# Patient Record
Sex: FEMALE | Race: BLACK OR AFRICAN AMERICAN | Employment: OTHER | URBAN - METROPOLITAN AREA
[De-identification: names, ages, dates, MRNs, and addresses within clinical notes are randomized per-mention and may not be internally consistent; named-entity substitution may affect disease eponyms.]

---

## 2017-06-02 ENCOUNTER — HOSPITAL ENCOUNTER (EMERGENCY)
Age: 57
Discharge: HOME | End: 2017-06-02
Attending: EMERGENCY MEDICINE | Admitting: EMERGENCY MEDICINE
Payer: MEDICARE

## 2017-06-02 VITALS
OXYGEN SATURATION: 100 % | DIASTOLIC BLOOD PRESSURE: 87 MMHG | HEIGHT: 64 IN | SYSTOLIC BLOOD PRESSURE: 134 MMHG | BODY MASS INDEX: 38.31 KG/M2 | RESPIRATION RATE: 18 BRPM | TEMPERATURE: 98 F | HEART RATE: 97 BPM | WEIGHT: 224.4 LBS

## 2017-06-02 DIAGNOSIS — R11.2 NON-INTRACTABLE VOMITING WITH NAUSEA, UNSPECIFIED VOMITING TYPE: Primary | ICD-10-CM

## 2017-06-02 DIAGNOSIS — R19.7 DIARRHEA, UNSPECIFIED TYPE: ICD-10-CM

## 2017-06-02 LAB
ALBUMIN SERPL BCP-MCNC: 4.1 G/DL (ref 3.4–5)
ALBUMIN/GLOB SERPL: 1.2 {RATIO} (ref 0.8–1.7)
ALP SERPL-CCNC: 102 U/L (ref 45–117)
ALT SERPL-CCNC: 13 U/L (ref 13–56)
ANION GAP BLD CALC-SCNC: 10 MMOL/L (ref 3–18)
APPEARANCE UR: CLEAR
AST SERPL W P-5'-P-CCNC: 14 U/L (ref 15–37)
BASOPHILS # BLD AUTO: 0 K/UL (ref 0–0.06)
BASOPHILS # BLD: 0 % (ref 0–2)
BILIRUB SERPL-MCNC: 0.5 MG/DL (ref 0.2–1)
BILIRUB UR QL: NEGATIVE
BUN SERPL-MCNC: 9 MG/DL (ref 7–18)
BUN/CREAT SERPL: 10 (ref 12–20)
CALCIUM SERPL-MCNC: 9.8 MG/DL (ref 8.5–10.1)
CHLORIDE SERPL-SCNC: 100 MMOL/L (ref 100–108)
CK MB CFR SERPL CALC: NORMAL % (ref 0–4)
CK MB SERPL-MCNC: <1 NG/ML (ref 5–25)
CK SERPL-CCNC: 46 U/L (ref 26–192)
CO2 SERPL-SCNC: 31 MMOL/L (ref 21–32)
COLOR UR: YELLOW
CREAT SERPL-MCNC: 0.93 MG/DL (ref 0.6–1.3)
DIFFERENTIAL METHOD BLD: NORMAL
EOSINOPHIL # BLD: 0.1 K/UL (ref 0–0.4)
EOSINOPHIL NFR BLD: 1 % (ref 0–5)
ERYTHROCYTE [DISTWIDTH] IN BLOOD BY AUTOMATED COUNT: 13.3 % (ref 11.6–14.5)
GLOBULIN SER CALC-MCNC: 3.5 G/DL (ref 2–4)
GLUCOSE SERPL-MCNC: 282 MG/DL (ref 74–99)
GLUCOSE UR STRIP.AUTO-MCNC: >1000 MG/DL
HCT VFR BLD AUTO: 42 % (ref 35–45)
HGB BLD-MCNC: 14.2 G/DL (ref 12–16)
HGB UR QL STRIP: NEGATIVE
KETONES UR QL STRIP.AUTO: NEGATIVE MG/DL
LEUKOCYTE ESTERASE UR QL STRIP.AUTO: NEGATIVE
LIPASE SERPL-CCNC: 292 U/L (ref 73–393)
LYMPHOCYTES # BLD AUTO: 36 % (ref 21–52)
LYMPHOCYTES # BLD: 1.7 K/UL (ref 0.9–3.6)
MCH RBC QN AUTO: 29.3 PG (ref 24–34)
MCHC RBC AUTO-ENTMCNC: 33.8 G/DL (ref 31–37)
MCV RBC AUTO: 86.6 FL (ref 74–97)
MONOCYTES # BLD: 0.4 K/UL (ref 0.05–1.2)
MONOCYTES NFR BLD AUTO: 8 % (ref 3–10)
NEUTS SEG # BLD: 2.6 K/UL (ref 1.8–8)
NEUTS SEG NFR BLD AUTO: 55 % (ref 40–73)
NITRITE UR QL STRIP.AUTO: NEGATIVE
PH UR STRIP: 5 [PH] (ref 5–8)
PLATELET # BLD AUTO: 194 K/UL (ref 135–420)
PMV BLD AUTO: 10.9 FL (ref 9.2–11.8)
POTASSIUM SERPL-SCNC: 3.3 MMOL/L (ref 3.5–5.5)
PROT SERPL-MCNC: 7.6 G/DL (ref 6.4–8.2)
PROT UR STRIP-MCNC: NEGATIVE MG/DL
RBC # BLD AUTO: 4.85 M/UL (ref 4.2–5.3)
SODIUM SERPL-SCNC: 141 MMOL/L (ref 136–145)
SP GR UR REFRACTOMETRY: 1.02 (ref 1–1.03)
TROPONIN I SERPL-MCNC: <0.02 NG/ML (ref 0–0.06)
UROBILINOGEN UR QL STRIP.AUTO: 1 EU/DL (ref 0.2–1)
WBC # BLD AUTO: 4.7 K/UL (ref 4.6–13.2)

## 2017-06-02 PROCEDURE — 99284 EMERGENCY DEPT VISIT MOD MDM: CPT

## 2017-06-02 PROCEDURE — 83690 ASSAY OF LIPASE: CPT

## 2017-06-02 PROCEDURE — 96375 TX/PRO/DX INJ NEW DRUG ADDON: CPT

## 2017-06-02 PROCEDURE — 74011250636 HC RX REV CODE- 250/636: Performed by: PHYSICIAN ASSISTANT

## 2017-06-02 PROCEDURE — 82550 ASSAY OF CK (CPK): CPT

## 2017-06-02 PROCEDURE — 80053 COMPREHEN METABOLIC PANEL: CPT

## 2017-06-02 PROCEDURE — 93005 ELECTROCARDIOGRAM TRACING: CPT

## 2017-06-02 PROCEDURE — 85025 COMPLETE CBC W/AUTO DIFF WBC: CPT

## 2017-06-02 PROCEDURE — 96374 THER/PROPH/DIAG INJ IV PUSH: CPT

## 2017-06-02 PROCEDURE — 96361 HYDRATE IV INFUSION ADD-ON: CPT

## 2017-06-02 PROCEDURE — 81003 URINALYSIS AUTO W/O SCOPE: CPT

## 2017-06-02 RX ORDER — KETOROLAC TROMETHAMINE 30 MG/ML
30 INJECTION, SOLUTION INTRAMUSCULAR; INTRAVENOUS
Status: COMPLETED | OUTPATIENT
Start: 2017-06-02 | End: 2017-06-02

## 2017-06-02 RX ORDER — ONDANSETRON 2 MG/ML
4 INJECTION INTRAMUSCULAR; INTRAVENOUS
Status: COMPLETED | OUTPATIENT
Start: 2017-06-02 | End: 2017-06-02

## 2017-06-02 RX ORDER — ONDANSETRON 4 MG/1
TABLET, ORALLY DISINTEGRATING ORAL
Qty: 10 TAB | Refills: 0 | Status: SHIPPED | OUTPATIENT
Start: 2017-06-02

## 2017-06-02 RX ORDER — KETOROLAC TROMETHAMINE 10 MG/1
10 TABLET, FILM COATED ORAL
Qty: 20 TAB | Refills: 0 | Status: SHIPPED | OUTPATIENT
Start: 2017-06-02 | End: 2017-06-07

## 2017-06-02 RX ADMIN — KETOROLAC TROMETHAMINE 30 MG: 30 INJECTION, SOLUTION INTRAMUSCULAR at 15:44

## 2017-06-02 RX ADMIN — SODIUM CHLORIDE 1000 ML: 900 INJECTION, SOLUTION INTRAVENOUS at 15:44

## 2017-06-02 RX ADMIN — ONDANSETRON 4 MG: 2 INJECTION INTRAMUSCULAR; INTRAVENOUS at 15:44

## 2017-06-02 NOTE — ED PROVIDER NOTES
HPI Comments: 64yo female presenting to ED with nausea, vomiting, diarrhea, abdominal discomfort, headache x4 days. Pt states she is diabetic, BS have been elevated ~300. Decreased appetite but able to tolerate fluids. Multiple episodes of diarrhea daily, denies blood. Denies fever, headaches, dizziness, CP, SOB, neck pain, back pain, urinary symptoms or any other symptoms at this time. Past Medical History:  3/28/2011: Brachial neuritis or radiculitis NOS  No date: Carpal tunnel syndrome on right  3/28/2011: Cervicalgia  No date: Diabetes ([address]  No date: GERD (gastroesophageal reflux disease)  No date: Hypertension  3/28/2011: Neuralgia, neuritis, and radiculitis, unspecif*  No date: Osteoarthritis  3/28/2011: Pain in joint, shoulder region      Comment: torn rotator cuff  3/28/2011: Postlaminectomy syndrome, cervical region   Angeles Russo MD PCP      Patient is a 64 y.o. female presenting with abdominal pain, vomiting, diarrhea, hyperglycemia, and head problem. The history is provided by the patient. Abdominal Pain    Associated symptoms include diarrhea and vomiting. Pertinent negatives include no fever, no nausea, no headaches, no chest pain and no back pain. Vomiting    Associated symptoms include abdominal pain and diarrhea. Pertinent negatives include no chills, no fever, no headaches, no cough and no headaches. Diarrhea    Associated symptoms include diarrhea and vomiting. Pertinent negatives include no fever, no nausea, no headaches, no chest pain and no back pain. High Blood Sugar    Associated symptoms include diarrhea and vomiting. Pertinent negatives include no fever, no nausea, no headaches, no chest pain and no back pain. Head Pain    Associated symptoms include vomiting. Pertinent negatives include no fever, no shortness of breath, no dizziness and no nausea.         Past Medical History:   Diagnosis Date    Brachial neuritis or radiculitis NOS 3/28/2011    Carpal tunnel syndrome on right     Cervicalgia 3/28/2011    Diabetes ([facility] [address]     GERD (gastroesophageal reflux disease)     Hypertension     Neuralgia, neuritis, and radiculitis, unspecified 3/28/2011    Osteoarthritis     Pain in joint, shoulder region 3/28/2011    torn rotator cuff    Postlaminectomy syndrome, cervical region 3/28/2011       Past Surgical History:   Procedure Laterality Date    HX ADENOIDECTOMY      HX CERVICAL LAMINECTOMY  2008(?)    HX DILATION AND CURETTAGE      HX ORTHOPAEDIC      right carpal tunnel release, right shoulder arthroscopy & rotator cuff repair    HX ORTHOPAEDIC      HX ORTHOPAEDIC      right shoulder rotator cuff repair    HX SCOT AND BSO      HX TONSILLECTOMY      HX TUBAL LIGATION           Family History:   Problem Relation Age of Onset    Diabetes Other     Hypertension Other     Other Other      CVA    Cancer Other      ovarian, prostate       Social History     Social History    Marital status:      Spouse name: N/A    Number of children: N/A    Years of education: N/A     Occupational History    Not on file. Social History Main Topics    Smoking status: Former Smoker     Quit date: 3/28/1991    Smokeless tobacco: Not on file    Alcohol use 2.0 oz/week     4 Cans of beer per week      Comment: Occasionally    Drug use: No    Sexual activity: Not on file     Other Topics Concern    Not on file     Social History Narrative         ALLERGIES: Bactrim [sulfamethoprim ds]; Codeine; and Vicodin [hydrocodone-acetaminophen]    Review of Systems   Constitutional: Negative for chills and fever. HENT: Negative. Negative for congestion and facial swelling. Eyes: Negative for discharge and redness. Respiratory: Negative for cough and shortness of breath. Cardiovascular: Negative for chest pain. Gastrointestinal: Positive for abdominal pain, diarrhea and vomiting. Negative for nausea. Endocrine: Negative. Genitourinary: Negative. Musculoskeletal: Negative for back pain and neck pain. Skin: Negative for rash and wound. Allergic/Immunologic: Negative. Neurological: Negative for dizziness, light-headedness and headaches. Hematological: Negative. Psychiatric/Behavioral: Negative. Vitals:    06/02/17 1452   BP: 134/87   Pulse: 97   Resp: 18   Temp: 98 °F (36.7 °C)   SpO2: 100%   Weight: 101.8 kg (224 lb 6.4 oz)   Height: 5' 4\" (1.626 m)            Physical Exam   Constitutional: She is oriented to person, place, and time. She appears well-developed and well-nourished. No distress. HENT:   Head: Normocephalic and atraumatic. Mouth/Throat: Oropharynx is clear and moist.   Eyes: Conjunctivae are normal.   Neck: Normal range of motion. Neck supple. Cardiovascular: Normal rate, regular rhythm and normal heart sounds. Pulmonary/Chest: Effort normal and breath sounds normal. No respiratory distress. She has no wheezes. She exhibits no tenderness. Abdominal: Soft. Bowel sounds are normal. She exhibits no distension and no mass. There is no tenderness. There is no rebound and no guarding. Musculoskeletal: Normal range of motion. Neurological: She is alert and oriented to person, place, and time. No cranial nerve deficit. Coordination normal.   CN2-12 intact. no nystagmus, neg pronator drift, neg romberg, neg LE drift. Normal gait. Skin: Skin is warm and dry. No rash noted. She is not diaphoretic. Psychiatric: She has a normal mood and affect. Nursing note and vitals reviewed. MDM  Number of Diagnoses or Management Options  Diarrhea, unspecified type:   Non-intractable vomiting with nausea, unspecified vomiting type:   Diagnosis management comments: Has generalized abd tenderness most likely associated with vomiting, diarrhea, no rebound or guarding. Labs, IVF, analgesia, UA      4:58 PM  Improvement in symptoms. Tolerating crackers and fluids without vomiting. No pain at present. Abdomen soft. Vitals normal. Pt states she is ready for d/c home. Labs reassuring, BS <300. Mild hypokalemia but probably from diarrhea. Rx for zofran, toradol. OTC immodium. Discussed proper way to take medications. Discussed treatment plan, return precautions, symptomatic relief, and expected time to improvement. All questions answered. Patient is stable for discharge and outpatient management.    Michael Melgar PA-C 4:59 PM        Amount and/or Complexity of Data Reviewed  Clinical lab tests: ordered and reviewed      ED Course       Procedures

## 2017-06-02 NOTE — ED NOTES
I performed a brief evaluation, including history and physical, of the patient here in triage and I have determined that pt will need further treatment and evaluation from the main side ER physician. I have placed initial orders to help in expediting patients care. June 02, 2017 at 2:53 PM - Arminda Gonzalez PA-C        There were no vitals taken for this visit.

## 2017-06-02 NOTE — DISCHARGE INSTRUCTIONS
Diarrhea: Care Instructions  Your Care Instructions    Diarrhea is loose, watery stools (bowel movements). The exact cause is often hard to find. Sometimes diarrhea is your body's way of getting rid of what caused an upset stomach. Viruses, food poisoning, and many medicines can cause diarrhea. Some people get diarrhea in response to emotional stress, anxiety, or certain foods. Almost everyone has diarrhea now and then. It usually isn't serious, and your stools will return to normal soon. The important thing to do is replace the fluids you have lost, so you can prevent dehydration. The doctor has checked you carefully, but problems can develop later. If you notice any problems or new symptoms, get medical treatment right away. Follow-up care is a key part of your treatment and safety. Be sure to make and go to all appointments, and call your doctor if you are having problems. It's also a good idea to know your test results and keep a list of the medicines you take. How can you care for yourself at home? · Watch for signs of dehydration, which means your body has lost too much water. Dehydration is a serious condition and should be treated right away. Signs of dehydration are:  ¨ Increasing thirst and dry eyes and mouth. ¨ Feeling faint or lightheaded. ¨ Darker urine, and a smaller amount of urine than normal.  · To prevent dehydration, drink plenty of fluids, enough so that your urine is light yellow or clear like water. Choose water and other caffeine-free clear liquids until you feel better. If you have kidney, heart, or liver disease and have to limit fluids, talk with your doctor before you increase the amount of fluids you drink. · Begin eating small amounts of mild foods the next day, if you feel like it. ¨ Try yogurt that has live cultures of Lactobacillus. (Check the label.)  ¨ Avoid spicy foods, fruits, alcohol, and caffeine until 48 hours after all symptoms are gone.   ¨ Avoid chewing gum that contains sorbitol. ¨ Avoid dairy products (except for yogurt with Lactobacillus) while you have diarrhea and for 3 days after symptoms are gone. · The doctor may recommend that you take over-the-counter medicine, such as loperamide (Imodium), if you still have diarrhea after 6 hours. Read and follow all instructions on the label. Do not use this medicine if you have bloody diarrhea, a high fever, or other signs of serious illness. Call your doctor if you think you are having a problem with your medicine. When should you call for help? Call 911 anytime you think you may need emergency care. For example, call if:  · You passed out (lost consciousness). · Your stools are maroon or very bloody. Call your doctor now or seek immediate medical care if:  · You are dizzy or lightheaded, or you feel like you may faint. · Your stools are black and look like tar, or they have streaks of blood. · You have new or worse belly pain. · You have symptoms of dehydration, such as:  ¨ Dry eyes and a dry mouth. ¨ Passing only a little dark urine. ¨ Feeling thirstier than usual.  · You have a new or higher fever. Watch closely for changes in your health, and be sure to contact your doctor if:  · Your diarrhea is getting worse. · You see pus in the diarrhea. · You are not getting better after 2 days (48 hours). Where can you learn more? Go to http://nancy-warren.info/. Enter Y263 in the search box to learn more about \"Diarrhea: Care Instructions. \"  Current as of: May 27, 2016  Content Version: 11.2  © 4795-1492 [email or web address]. Care instructions adapted under license by [employer] (which disclaims liability or warranty for this information). If you have questions about a medical condition or this instruction, always ask your healthcare professional. [address] any warranty or liability for your use of this information.          Nausea and Vomiting: Care Instructions  Your Care Instructions    When you are nauseated, you may feel weak and sweaty and notice a lot of saliva in your mouth. Nausea often leads to vomiting. Most of the time you do not need to worry about nausea and vomiting, but they can be signs of other illnesses. Two common causes of nausea and vomiting are stomach flu and food poisoning. Nausea and vomiting from viral stomach flu will usually start to improve within 24 hours. Nausea and vomiting from food poisoning may last from 12 to 48 hours. The doctor has checked you carefully, but problems can develop later. If you notice any problems or new symptoms, get medical treatment right away. Follow-up care is a key part of your treatment and safety. Be sure to make and go to all appointments, and call your doctor if you are having problems. It's also a good idea to know your test results and keep a list of the medicines you take. How can you care for yourself at home? · To prevent dehydration, drink plenty of fluids, enough so that your urine is light yellow or clear like water. Choose water and other caffeine-free clear liquids until you feel better. If you have kidney, heart, or liver disease and have to limit fluids, talk with your doctor before you increase the amount of fluids you drink. · Rest in bed until you feel better. · When you are able to eat, try clear soups, mild foods, and liquids until all symptoms are gone for 12 to 48 hours. Other good choices include dry toast, crackers, cooked cereal, and gelatin dessert, such as Jell-O. When should you call for help? Call 911 anytime you think you may need emergency care. For example, call if:  · You passed out (lost consciousness). Call your doctor now or seek immediate medical care if:  · You have symptoms of dehydration, such as:  ¨ Dry eyes and a dry mouth. ¨ Passing only a little dark urine. ¨ Feeling thirstier than usual.  · You have new or worsening belly pain.   · You have a new or higher fever. · You vomit blood or what looks like coffee grounds. Watch closely for changes in your health, and be sure to contact your doctor if:  · You have ongoing nausea and vomiting. · Your vomiting is getting worse. · Your vomiting lasts longer than 2 days. · You are not getting better as expected. Where can you learn more? Go to http://nancy-warren.info/. Enter 25 [id number] in the search box to learn more about \"Nausea and Vomiting: Care Instructions. \"  Current as of: May 27, 2016  Content Version: 11.2  © 5029-3037 [employer]. Care instructions adapted under license by [employer] (which disclaims liability or warranty for this information). If you have questions about a medical condition or this instruction, always ask your healthcare professional. [address] any warranty or liability for your use of this information.

## 2017-06-03 LAB
ATRIAL RATE: 75 BPM
CALCULATED P AXIS, ECG09: 47 DEGREES
CALCULATED R AXIS, ECG10: -10 DEGREES
CALCULATED T AXIS, ECG11: 31 DEGREES
DIAGNOSIS, 93000: NORMAL
P-R INTERVAL, ECG05: 142 MS
Q-T INTERVAL, ECG07: 374 MS
QRS DURATION, ECG06: 70 MS
QTC CALCULATION (BEZET), ECG08: 417 MS
VENTRICULAR RATE, ECG03: 75 BPM

## 2017-08-24 ENCOUNTER — DOCUMENTATION ONLY (OUTPATIENT)
Dept: ORTHOPEDIC SURGERY | Age: 57
End: 2017-08-24

## 2017-08-24 ENCOUNTER — OFFICE VISIT (OUTPATIENT)
Dept: ORTHOPEDIC SURGERY | Age: 57
End: 2017-08-24

## 2017-08-24 VITALS
RESPIRATION RATE: 14 BRPM | WEIGHT: 235.2 LBS | DIASTOLIC BLOOD PRESSURE: 77 MMHG | HEIGHT: 64 IN | HEART RATE: 94 BPM | BODY MASS INDEX: 40.15 KG/M2 | SYSTOLIC BLOOD PRESSURE: 130 MMHG

## 2017-08-24 DIAGNOSIS — M25.511 CHRONIC RIGHT SHOULDER PAIN: ICD-10-CM

## 2017-08-24 DIAGNOSIS — M79.18 MYOFASCIAL MUSCLE PAIN: ICD-10-CM

## 2017-08-24 DIAGNOSIS — G89.29 CHRONIC RIGHT SHOULDER PAIN: ICD-10-CM

## 2017-08-24 DIAGNOSIS — M54.2 NECK PAIN: ICD-10-CM

## 2017-08-24 DIAGNOSIS — M96.1 CERVICAL POST-LAMINECTOMY SYNDROME: Primary | ICD-10-CM

## 2017-08-24 DIAGNOSIS — M62.838 MUSCLE SPASM: ICD-10-CM

## 2017-08-24 DIAGNOSIS — R29.898 RIGHT ARM WEAKNESS: ICD-10-CM

## 2017-08-24 DIAGNOSIS — M79.2 NEURITIS: ICD-10-CM

## 2017-08-24 RX ORDER — HYDROCODONE BITARTRATE AND ACETAMINOPHEN 5; 325 MG/1; MG/1
TABLET ORAL
COMMUNITY
Start: 2017-07-11 | End: 2017-12-14 | Stop reason: ALTCHOICE

## 2017-08-24 RX ORDER — METAXALONE 800 MG/1
800 TABLET ORAL 3 TIMES DAILY
Qty: 60 TAB | Refills: 0 | Status: SHIPPED | OUTPATIENT
Start: 2017-08-24 | End: 2017-12-14 | Stop reason: SDUPTHER

## 2017-08-24 RX ORDER — DULOXETIN HYDROCHLORIDE 60 MG/1
60 CAPSULE, DELAYED RELEASE ORAL DAILY
Qty: 30 CAP | Refills: 1 | Status: SHIPPED | OUTPATIENT
Start: 2017-08-24

## 2017-08-24 RX ORDER — KETOROLAC TROMETHAMINE 15 MG/ML
60 INJECTION, SOLUTION INTRAMUSCULAR; INTRAVENOUS ONCE
Qty: 1 VIAL | Refills: 0
Start: 2017-08-24 | End: 2017-08-24

## 2017-08-24 RX ORDER — VALSARTAN AND HYDROCHLOROTHIAZIDE 160; 12.5 MG/1; MG/1
1 TABLET, FILM COATED ORAL DAILY
COMMUNITY
Start: 2017-06-09

## 2017-08-24 RX ORDER — GABAPENTIN 300 MG/1
300 CAPSULE ORAL 2 TIMES DAILY
COMMUNITY
Start: 2017-06-09 | End: 2017-08-24 | Stop reason: SDUPTHER

## 2017-08-24 RX ORDER — SERTRALINE HYDROCHLORIDE 50 MG/1
50 TABLET, FILM COATED ORAL DAILY
COMMUNITY
Start: 2017-06-09 | End: 2017-12-14 | Stop reason: ALTCHOICE

## 2017-08-24 RX ORDER — GABAPENTIN 300 MG/1
600 CAPSULE ORAL 3 TIMES DAILY
Qty: 180 CAP | Refills: 1 | Status: SHIPPED | OUTPATIENT
Start: 2017-08-24 | End: 2018-02-07 | Stop reason: SDUPTHER

## 2017-08-24 RX ORDER — DULOXETIN HYDROCHLORIDE 30 MG/1
30 CAPSULE, DELAYED RELEASE ORAL DAILY
Qty: 7 CAP | Refills: 0 | Status: SHIPPED | OUTPATIENT
Start: 2017-08-24 | End: 2017-12-14 | Stop reason: SDUPTHER

## 2017-08-24 RX ORDER — ATORVASTATIN CALCIUM 40 MG/1
40 TABLET, FILM COATED ORAL DAILY
COMMUNITY
Start: 2017-06-09

## 2017-08-24 NOTE — MR AVS SNAPSHOT
Visit Information Date & Time Provider Department Dept. Phone Encounter #  
 8/24/2017  8:15 AM Eric Owen  [facility], [address] [facility] [place] [phone] Follow-up Instructions Return in about 6 weeks (around 10/5/2017) for PT follow up, Medication follow up, Diagnostic Test follow up. Upcoming Health Maintenance Date Due Hepatitis C Screening 1960 DTaP/Tdap/Td series (1 - Tdap) 11/25/1981 PAP AKA CERVICAL CYTOLOGY 11/25/1981 BREAST CANCER SCRN MAMMOGRAM 11/25/2010 FOBT Q 1 YEAR AGE 50-75 11/25/2010 INFLUENZA AGE 9 TO ADULT 8/1/2017 Allergies as of 8/24/2017  Review Complete On: 8/24/2017 By: Jon Wilcox LPN Severity Noted Reaction Type Reactions Bactrim [Sulfamethoprim Ds]  08/21/2016    Rash Codeine  03/28/2011   Side Effect Other (comments) Upset stomach Vicodin [Hydrocodone-acetaminophen]  08/22/2011    Other (comments)  
 hallucinations Current Immunizations  Never Reviewed No immunizations on file. Not reviewed this visit You Were Diagnosed With   
  
 Codes Comments Neck pain    -  Primary ICD-10-CM: M54.2 ICD-9-CM: 723.1 Cervical post-laminectomy syndrome     ICD-10-CM: M96.1 ICD-9-CM: 722.81 Muscle spasm     ICD-10-CM: O21.131 ICD-9-CM: 728.85 Neuritis     ICD-10-CM: M79.2 ICD-9-CM: 729.2 Right arm weakness     ICD-10-CM: R29.898 ICD-9-CM: 729.89 Chronic right shoulder pain     ICD-10-CM: M25.511, G89.29 ICD-9-CM: 719.41, 338.29 Vitals BP Pulse Resp Height(growth percentile) Weight(growth percentile) BMI  
 130/77 94 14 5' 4\" (1.626 m) 235 lb 3.2 oz (106.7 kg) 40.37 kg/m2 OB Status Smoking Status Hysterectomy Former Smoker BMI and BSA Data Body Mass Index Body Surface Area  
 40.37 kg/m 2 2.2 m 2 Preferred Pharmacy Pharmacy Name Phone [facility] [address], [address] [place] [phone] Your Updated Medication List  
  
   
This list is accurate as of: 8/24/17 10:03 AM.  Always use your most recent med list.  
  
  
  
  
 atorvastatin 40 mg tablet Commonly known as:  LIPITOR Take 40 mg by mouth daily. butalbital-acetaminophen-caffeine -40 mg per tablet Commonly known as:  [employer] Take 1-2 tablets by mouth every 4 hours as needed for headache. Maximum dose 6 capsules daily. * DULoxetine 30 mg capsule Commonly known as:  CYMBALTA Take 1 Cap by mouth daily. * DULoxetine 60 mg capsule Commonly known as:  CYMBALTA Take 1 Cap by mouth daily. gabapentin 300 mg capsule Commonly known as:  NEURONTIN Take 2 Caps by mouth three (3) times daily. HYDROcodone-acetaminophen 5-325 mg per tablet Commonly known as:  NORCO  
  
 ketorolac 15 mg/mL Soln injection Commonly known as:  TORADOL  
4 mL by IntraMUSCular route once for 1 dose. metaxalone 800 mg tablet Commonly known as:  SKELAXIN Take 1 Tab by mouth three (3) times daily. metFORMIN 1,000 mg tablet Commonly known as:  GLUCOPHAGE Take 1,000 mg by mouth two (2) times daily (with meals). ondansetron 4 mg disintegrating tablet Commonly known as:  ZOFRAN ODT Take 1-2 tablets every 6-8 hours as needed for nausea and vomiting. PAXIL 20 mg tablet Generic drug:  PARoxetine Take  by mouth daily. sertraline 50 mg tablet Commonly known as:  ZOLOFT Take 50 mg by mouth daily. valsartan-hydroCHLOROthiazide 160-12.5 mg per tablet Commonly known as:  DIOVAN-HCT Take 1 Tab by mouth daily. * Notice: This list has 2 medication(s) that are the same as other medications prescribed for you. Read the directions carefully, and ask your doctor or other care provider to review them with you. Prescriptions Sent to Pharmacy Refills gabapentin (NEURONTIN) 300 mg capsule 1 Sig: Take 2 Caps by mouth three (3) times daily. Class: Normal  
 Pharmacy: [address] [place] [address]. Rd[address] [place] [address] [address] [facility] Ph #: [phone] Route: Oral  
 DULoxetine (CYMBALTA) 30 mg capsule 0 Sig: Take 1 Cap by mouth daily. Class: Normal  
 Pharmacy: [address] [place] [address]. Rd[address] [place] [address] [address] [facility] Ph #: [phone] Route: Oral  
 DULoxetine (CYMBALTA) 60 mg capsule 1 Sig: Take 1 Cap by mouth daily. Class: Normal  
 Pharmacy: [address] [place] [address]. Rd[address] [place] [address] [address] [facility] Ph #: [phone] Route: Oral  
 metaxalone (SKELAXIN) 800 mg tablet 0 Sig: Take 1 Tab by mouth three (3) times daily. Class: Normal  
 Pharmacy: [address] [place] [address]. Rd[address] [place] [address] [address] [facility] Ph #: [phone] Route: Oral  
  
We Performed the Following AMB POC XRAY, SPINE, CERVICAL; 2 OR 3 [13985 CPT(R)] KETOROLAC TROMETHAMINE INJ [ [facility] [place] [place] THER/PROPH/DIAG INJECTION, SUBCUT/IM [id number] CPT(R)] REFERRAL TO PHYSICAL THERAPY [TOA08 Custom] Comments:  
 Eval/Treat Cervical PT.  ROM/Modalities/Dry Needling. Patient prefers [address] [place] [address] location. Patient is  Worker's Comp Follow-up Instructions Return in about 6 weeks (around 10/5/2017) for PT follow up, Medication follow up, Diagnostic Test follow up. Referral Information Referral ID Referred By Referred To  
  
 [id number] Jose C Quintero Not Available Visits Status Start Date End Date 1 New Request 8/24/17 8/24/18 If your referral has a status of pending review or denied, additional information will be sent to support the outcome of this decision. Patient Instructions Neck Arthritis: Exercises Your Care Instructions Here are some examples of typical rehabilitation exercises for your condition. Start each exercise slowly. Ease off the exercise if you start to have pain. Your doctor or physical therapist will tell you when you can start these exercises and which ones will work best for you. How to do the exercises Neck stretches to the side 1. This stretch works best if you keep your shoulder down as you lean away from it. To help you remember to do this, start by relaxing your shoulders and lightly holding on to your thighs or your chair. 2. Tilt your head toward your shoulder and hold for 15 to 30 seconds. Let the weight of your head stretch your muscles. 3. Repeat 2 to 4 times toward each shoulder. Chin tuck 1. Lie on the floor with a rolled-up towel under your neck. Your head should be touching the floor. 2. Slowly bring your chin toward your chest. 
3. Hold for a count of 6, and then relax for up to 10 seconds. 4. Repeat 8 to 12 times. Active cervical rotation 1. Sit in a firm chair, or stand up straight. 2. Keeping your chin level, turn your head to the right, and hold for 15 to 30 seconds. 3. Turn your head to the left and hold for 15 to 30 seconds. 4. Repeat 2 to 4 times to each side. Shoulder blade squeeze 1. While standing, squeeze your shoulder blades together. 2. Do not raise your shoulders up as you are squeezing. 3. Hold for 6 seconds. 4. Repeat 8 to 12 times. Shoulder rolls 1. Sit comfortably with your feet shoulder-width apart. You can also do this exercise standing up. 2. Roll your shoulders up, then back, and then down in a smooth, circular motion. 3. Repeat 2 to 4 times. Follow-up care is a key part of your treatment and safety. Be sure to make and go to all appointments, and call your doctor if you are having problems. It's also a good idea to know your test results and keep a list of the medicines you take. Where can you learn more? Go to http://nancy-warren.info/. Enter U076 in the search box to learn more about \"Neck Arthritis: Exercises. \" Current as of: March 21, 2017 Content Version: 11.3 © 1332-1323 Healthwise, [employer]. Care instructions adapted under license by [employer] (which disclaims liability or warranty for this information). If you have questions about a medical condition or this instruction, always ask your healthcare professional. [address] any warranty or liability for your use of this information. Shoulder Arthritis: Exercises Your Care Instructions Here are some examples of typical rehabilitation exercises for your condition. Start each exercise slowly. Ease off the exercise if you start to have pain. Your doctor or physical therapist will tell you when you can start these exercises and which ones will work best for you. How to do the exercises Shoulder flexion (lying down) Note: To make a wand for this exercise, use a piece of PVC pipe or a broom handle with the broom removed. Make the wand about a foot wider than your shoulders. 4. Lie on your back, holding a wand with both hands. Your palms should face down as you hold the wand. 5. Keeping your elbows straight, slowly raise your arms over your head. Raise them until you feel a stretch in your shoulders, upper back, and chest. 
6. Hold for 15 to 30 seconds. 7. Repeat 2 to 4 times. Shoulder rotation (lying down) Note: To make a wand for this exercise, use a piece of PVC pipe or a broom handle with the broom removed. Make the wand about a foot wider than your shoulders. 5. Lie on your back. Hold a wand with both hands with your elbows bent and palms up. 6. Keep your elbows close to your body, and move the wand across your body toward the sore arm. 7. Hold for 8 to 12 seconds. 8. Repeat 2 to 4 times. Shoulder internal rotation with towel 5. Hold a towel above and behind your head with the arm that is not sore. 6. With your sore arm, reach behind your back and grasp the towel. 7. With the arm above your head, pull the towel upward.  Do this until you feel a stretch on the front and outside of your sore shoulder. 8. Hold 15 to 30 seconds. 9. Repeat 2 to 4 times. Shoulder blade squeeze 5. Stand with your arms at your sides, and squeeze your shoulder blades together. Do not raise your shoulders up as you squeeze. 6. Hold 6 seconds. 7. Repeat 8 to 12 times. Resisted rows Note: For this exercise, you will need elastic exercise material, such as surgical tubing or Thera-Band. 4. Put the band around a solid object at about waist level. (A bedpost will work well.) Each hand should hold an end of the band. 5. With your elbows at your sides and bent to 90 degrees, pull the band back. Your shoulder blades should move toward each other. Return to the starting position. 6. Repeat 8 to 12 times. External rotator strengthening exercise 1. Start by tying a piece of elastic exercise material to a doorknob. You can use surgical tubing or Thera-Band. (You may also hold one end of the band in each hand.) 2. Stand or sit with your shoulder relaxed and your elbow bent 90 degrees. Your upper arm should rest comfortably against your side. Squeeze a rolled towel between your elbow and your body for comfort. This will help keep your arm at your side. 3. Hold one end of the elastic band with the hand of the painful arm. 4. Start with your forearm across your belly. Slowly rotate the forearm out away from your body. Keep your elbow and upper arm tucked against the towel roll or the side of your body until you begin to feel tightness in your shoulder. Slowly move your arm back to where you started. 5. Repeat 8 to 12 times. Internal rotator strengthening exercise 1. Start by tying a piece of elastic exercise material to a doorknob. You can use surgical tubing or Thera-Band. 2. Stand or sit with your shoulder relaxed and your elbow bent 90 degrees. Your upper arm should rest comfortably against your side.  Squeeze a rolled towel between your elbow and your body for comfort. This will help keep your arm at your side. 3. Hold one end of the elastic band in the hand of the painful arm. 4. Slowly rotate your forearm toward your body until it touches your belly. Slowly move it back to where you started. 5. Keep your elbow and upper arm firmly tucked against the towel roll or at your side. 6. Repeat 8 to 12 times. Pendulum swing Note: If you have pain in your back, do not do this exercise. 1. Hold on to a table or the back of a chair with your good arm. Then bend forward a little and let your sore arm hang straight down. This exercise does not use the arm muscles. Rather, use your legs and your hips to create movement that makes your arm swing freely. 2. Use the movement from your hips and legs to guide the slightly swinging arm back and forth like a pendulum (or elephant trunk). Then guide it in circles that start small (about the size of a dinner plate). Make the circles a bit larger each day, as your pain allows. 3. Do this exercise for 5 minutes, 5 to 7 times each day. 4. As you have less pain, try bending over a little farther to do this exercise. This will increase the amount of movement at your shoulder. Follow-up care is a key part of your treatment and safety. Be sure to make and go to all appointments, and call your doctor if you are having problems. It's also a good idea to know your test results and keep a list of the medicines you take. Where can you learn more? Go to http://nancy-warren.info/. Enter H562 in the search box to learn more about \"Shoulder Arthritis: Exercises. \" Current as of: March 21, 2017 Content Version: 11.3 © 7489-0335 [employer], [employer]. Care instructions adapted under license by [employer] (which disclaims liability or warranty for this information).  If you have questions about a medical condition or this instruction, always ask your healthcare professional. [place] [address] any warranty or liability for your use of this information. Introducing [facility]! Diego Justin introduces FlowJob patient portal. Now you can access parts of your medical record, email your doctor's office, and request medication refills online. 1. In your internet browser, go to [email or web address] 2. Click on the First Time User? Click Here link in the Sign In box. You will see the New Member Sign Up page. 3. Enter your FlowJob Access Code exactly as it appears below. You will not need to use this code after youve completed the sign-up process. If you do not sign up before the expiration date, you must request a new code. · FlowJob Access Code: [email or web address] Expires: 8/31/2017  4:54 PM 
 
4. Enter the last four digits of your Social Security Number (xxxx) and Date of Birth (mm/dd/yyyy) as indicated and click Submit. You will be taken to the next sign-up page. 5. Create a FlowJob ID. This will be your FlowJob login ID and cannot be changed, so think of one that is secure and easy to remember. 6. Create a FlowJob password. You can change your password at any time. 7. Enter your Password Reset Question and Answer. This can be used at a later time if you forget your password. 8. Enter your e-mail address. You will receive e-mail notification when new information is available in [address] 9. Click Sign Up. You can now view and download portions of your medical record. 10. Click the Download Summary menu link to download a portable copy of your medical information. If you have questions, please visit the Frequently Asked Questions section of the FlowJob website. Remember, FlowJob is NOT to be used for urgent needs. For medical emergencies, dial 911. Now available from your iPhone and Android! Please provide this summary of care documentation to your next provider. Your primary care clinician is listed as Brian Conti. If you have any questions after today's visit, please call [phone].

## 2017-08-24 NOTE — PATIENT INSTRUCTIONS
Neck Arthritis: Exercises  Your Care Instructions  Here are some examples of typical rehabilitation exercises for your condition. Start each exercise slowly. Ease off the exercise if you start to have pain. Your doctor or physical therapist will tell you when you can start these exercises and which ones will work best for you. How to do the exercises  Neck stretches to the side    1. This stretch works best if you keep your shoulder down as you lean away from it. To help you remember to do this, start by relaxing your shoulders and lightly holding on to your thighs or your chair. 2. Tilt your head toward your shoulder and hold for 15 to 30 seconds. Let the weight of your head stretch your muscles. 3. Repeat 2 to 4 times toward each shoulder. Chin tuck    1. Lie on the floor with a rolled-up towel under your neck. Your head should be touching the floor. 2. Slowly bring your chin toward your chest.  3. Hold for a count of 6, and then relax for up to 10 seconds. 4. Repeat 8 to 12 times. Active cervical rotation    1. Sit in a firm chair, or stand up straight. 2. Keeping your chin level, turn your head to the right, and hold for 15 to 30 seconds. 3. Turn your head to the left and hold for 15 to 30 seconds. 4. Repeat 2 to 4 times to each side. Shoulder blade squeeze    1. While standing, squeeze your shoulder blades together. 2. Do not raise your shoulders up as you are squeezing. 3. Hold for 6 seconds. 4. Repeat 8 to 12 times. Shoulder rolls    1. Sit comfortably with your feet shoulder-width apart. You can also do this exercise standing up. 2. Roll your shoulders up, then back, and then down in a smooth, circular motion. 3. Repeat 2 to 4 times. Follow-up care is a key part of your treatment and safety. Be sure to make and go to all appointments, and call your doctor if you are having problems. It's also a good idea to know your test results and keep a list of the medicines you take.   Where can you learn more? Go to http://nancy-warren.info/. Enter P392 in the search box to learn more about \"Neck Arthritis: Exercises. \"  Current as of: March 21, 2017  Content Version: 11.3  © 8711-1964 [employer]. Care instructions adapted under license by [employer] (which disclaims liability or warranty for this information). If you have questions about a medical condition or this instruction, always ask your healthcare professional. [address] any warranty or liability for your use of this information. Shoulder Arthritis: Exercises  Your Care Instructions  Here are some examples of typical rehabilitation exercises for your condition. Start each exercise slowly. Ease off the exercise if you start to have pain. Your doctor or physical therapist will tell you when you can start these exercises and which ones will work best for you. How to do the exercises  Shoulder flexion (lying down)    Note: To make a wand for this exercise, use a piece of PVC pipe or a broom handle with the broom removed. Make the wand about a foot wider than your shoulders. 4. Lie on your back, holding a wand with both hands. Your palms should face down as you hold the wand. 5. Keeping your elbows straight, slowly raise your arms over your head. Raise them until you feel a stretch in your shoulders, upper back, and chest.  6. Hold for 15 to 30 seconds. 7. Repeat 2 to 4 times. Shoulder rotation (lying down)    Note: To make a wand for this exercise, use a piece of PVC pipe or a broom handle with the broom removed. Make the wand about a foot wider than your shoulders. 5. Lie on your back. Hold a wand with both hands with your elbows bent and palms up. 6. Keep your elbows close to your body, and move the wand across your body toward the sore arm. 7. Hold for 8 to 12 seconds. 8. Repeat 2 to 4 times. Shoulder internal rotation with towel    5.  Hold a towel above and behind your head with the arm that is not sore. 6. With your sore arm, reach behind your back and grasp the towel. 7. With the arm above your head, pull the towel upward. Do this until you feel a stretch on the front and outside of your sore shoulder. 8. Hold 15 to 30 seconds. 9. Repeat 2 to 4 times. Shoulder blade squeeze    5. Stand with your arms at your sides, and squeeze your shoulder blades together. Do not raise your shoulders up as you squeeze. 6. Hold 6 seconds. 7. Repeat 8 to 12 times. Resisted rows    Note: For this exercise, you will need elastic exercise material, such as surgical tubing or Thera-Band. 4. Put the band around a solid object at about waist level. (A bedpost will work well.) Each hand should hold an end of the band. 5. With your elbows at your sides and bent to 90 degrees, pull the band back. Your shoulder blades should move toward each other. Return to the starting position. 6. Repeat 8 to 12 times. External rotator strengthening exercise    1. Start by tying a piece of elastic exercise material to a doorknob. You can use surgical tubing or Thera-Band. (You may also hold one end of the band in each hand.)  2. Stand or sit with your shoulder relaxed and your elbow bent 90 degrees. Your upper arm should rest comfortably against your side. Squeeze a rolled towel between your elbow and your body for comfort. This will help keep your arm at your side. 3. Hold one end of the elastic band with the hand of the painful arm. 4. Start with your forearm across your belly. Slowly rotate the forearm out away from your body. Keep your elbow and upper arm tucked against the towel roll or the side of your body until you begin to feel tightness in your shoulder. Slowly move your arm back to where you started. 5. Repeat 8 to 12 times. Internal rotator strengthening exercise    1. Start by tying a piece of elastic exercise material to a doorknob.  You can use surgical tubing or Thera-Band. 2. Stand or sit with your shoulder relaxed and your elbow bent 90 degrees. Your upper arm should rest comfortably against your side. Squeeze a rolled towel between your elbow and your body for comfort. This will help keep your arm at your side. 3. Hold one end of the elastic band in the hand of the painful arm. 4. Slowly rotate your forearm toward your body until it touches your belly. Slowly move it back to where you started. 5. Keep your elbow and upper arm firmly tucked against the towel roll or at your side. 6. Repeat 8 to 12 times. Pendulum swing    Note: If you have pain in your back, do not do this exercise. 1. Hold on to a table or the back of a chair with your good arm. Then bend forward a little and let your sore arm hang straight down. This exercise does not use the arm muscles. Rather, use your legs and your hips to create movement that makes your arm swing freely. 2. Use the movement from your hips and legs to guide the slightly swinging arm back and forth like a pendulum (or elephant trunk). Then guide it in circles that start small (about the size of a dinner plate). Make the circles a bit larger each day, as your pain allows. 3. Do this exercise for 5 minutes, 5 to 7 times each day. 4. As you have less pain, try bending over a little farther to do this exercise. This will increase the amount of movement at your shoulder. Follow-up care is a key part of your treatment and safety. Be sure to make and go to all appointments, and call your doctor if you are having problems. It's also a good idea to know your test results and keep a list of the medicines you take. Where can you learn more? Go to http://nancy-warren.info/. Enter H562 in the search box to learn more about \"Shoulder Arthritis: Exercises. \"  Current as of: March 21, 2017  Content Version: 11.3  © 7170-6296 [employer].  Care instructions adapted under license by [employer] (which disclaims liability or warranty for this information). If you have questions about a medical condition or this instruction, always ask your healthcare professional. [address] any warranty or liability for your use of this information.

## 2017-08-24 NOTE — PROGRESS NOTES
[facility]  [address] [place] [address], [facility] [address]  Phone: [phone]  Fax: [phone]      ASSESSMENT   Diagnoses and all orders for this visit:    1. Cervical post-laminectomy syndrome  -     KETOROLAC TROMETHAMINE INJ  -     ketorolac (TORADOL) 15 mg/mL soln injection; 4 mL by IntraMUSCular route once for 1 dose.  -     REFERRAL TO PHYSICAL THERAPY  -     MRI CERV SPINE WO CONT; Future    2. Muscle spasm  -     metaxalone (SKELAXIN) 800 mg tablet; Take 1 Tab by mouth three (3) times daily.  -     KETOROLAC TROMETHAMINE INJ  -     THER/PROPH/DIAG INJECTION, SUBCUT/IM  -     REFERRAL TO PHYSICAL THERAPY    3. Neuritis  -     gabapentin (NEURONTIN) 300 mg capsule; Take 2 Caps by mouth three (3) times daily.  -     REFERRAL TO PHYSICAL THERAPY  -     MRI CERV SPINE WO CONT; Future    4. Right arm weakness  -     REFERRAL TO PHYSICAL THERAPY  -     MRI CERV SPINE WO CONT; Future    5. Chronic right shoulder pain  -     DULoxetine (CYMBALTA) 30 mg capsule; Take 1 Cap by mouth daily.  -     DULoxetine (CYMBALTA) 60 mg capsule; Take 1 Cap by mouth daily.  -     REFERRAL TO PHYSICAL THERAPY    6. Myofascial muscle pain  -     DULoxetine (CYMBALTA) 30 mg capsule; Take 1 Cap by mouth daily.  -     DULoxetine (CYMBALTA) 60 mg capsule; Take 1 Cap by mouth daily. 7. Neck pain  -     [92783] C Spine 2-3V  -     KETOROLAC TROMETHAMINE INJ  -     THER/PROPH/DIAG INJECTION, SUBCUT/IM         IMPRESSION AND PLAN:  Sindy Solorzano is a 64 y.o. right hand dominant female with history of cervical and right shoulder pain. She c/o pain in the neck that radiates forward with progressive pain in the right shoulder radiating down the entire right arm. She had prior cervical surgery with Dr. Ashley Ba in 2008 and a right rotator cuff repair in 2003. 1) Pt was given information on cervical and right shoulder arthritis exercises.    2) She was referred to cervical physical therapy with consideration for dry needling. 3) Pt will discontinue Zoloft. 4) She was prescribed Cymbalta 30 mg 1 tab daily for 1 week. Pt will then increase to Cymbalta 60 mg 1 tab daily. 5) A cervical MRI with sedation was ordered. 6) Pt will increase her Neurontin 400 mg to 2 tabs TID to better manage her symptoms. 7) She was prescribed Skelaxin 800 mg 1 tab TID prn muscle spasm. 8) Pt received a Toradol injection in the office today. 9) Ms. Perez Carbone has a reminder for a \"due or due soon\" health maintenance. I have asked that she contact her primary care provider, Brian Conti MD, for follow-up on this health maintenance. 10)  demonstrated consistency with prescribing.   11) Pt will follow-up in 6 weeks. HISTORY OF PRESENT ILLNESS:  Khanh Perez is a 64 y.o. right hand dominant female with history of cervical and right shoulder pain. She c/o pain in the neck that radiates forward. Pt reports progressive pain radiating down the entire right arm. She admits to numbness and tingling in the fingers on the right. She admits to difficulty picking up objects and states that she occasionally drops things. Pt denies any issues with balance and reports that she changes positions very slowly. Her pain is worse with all activity and positions. Pt admits that she does not sleep well at night. Of note, she had prior cervical surgery with Dr. Robin Heath in . Pt admits that she has been experiencing neck pain since the surgery. 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 At that time she tore her right rotator cuff and then had surgery in  with Dr. Indira Mcgovern [facility] in [place], [place]. Pt states that she has tried injections and was in pain management with Dr. Raman Echavarria for years. She has tried cervical physical therapy years ago and denies ever trying dry needling.  Pt has been prescribed Neurontin 400 mg and takes 2 tabs BID. Pt was placed on Zoloft 50 mg about 2 months ago to help with sleep but has experience no improvement. She denies ever trying Cymbalta. Pt tried Flexeril while in the ER but it was not effective. Pt at this time desires to proceed with medication evaluation and referral to cervical physical therapy. Of note, Pt is diabetic and admits that her blood sugars have recently been elevated. Pain Scale: 9/10    PCP: James Hudson MD       Past Medical History:   Diagnosis Date    Brachial neuritis or radiculitis NOS 3/28/2011    Carpal tunnel syndrome on right     Cervicalgia 3/28/2011    Diabetes ([facility] [address]     GERD (gastroesophageal reflux disease)     Hypertension     Neuralgia, neuritis, and radiculitis, unspecified 3/28/2011    Osteoarthritis     Pain in joint, shoulder region 3/28/2011    torn rotator cuff    Postlaminectomy syndrome, cervical region 3/28/2011        Social History     Social History    Marital status:      Spouse name: N/A    Number of children: N/A    Years of education: N/A     Occupational History    Not on file. Social History Main Topics    Smoking status: Former Smoker     Quit date: 3/28/1991    Smokeless tobacco: Never Used    Alcohol use 2.0 oz/week     4 Cans of beer per week      Comment: Occasionally    Drug use: No    Sexual activity: Not on file     Other Topics Concern    Not on file     Social History Narrative       Current Outpatient Prescriptions   Medication Sig Dispense Refill    atorvastatin (LIPITOR) 40 mg tablet Take 40 mg by mouth daily.  sertraline (ZOLOFT) 50 mg tablet Take 50 mg by mouth daily.  valsartan-hydroCHLOROthiazide (DIOVAN-HCT) 160-12.5 mg per tablet Take 1 Tab by mouth daily.  gabapentin (NEURONTIN) 300 mg capsule Take 2 Caps by mouth three (3) times daily. 180 Cap 1    DULoxetine (CYMBALTA) 30 mg capsule Take 1 Cap by mouth daily.  7 Cap 0    DULoxetine (CYMBALTA) 60 mg capsule Take 1 Cap by mouth daily. 30 Cap 1    metaxalone (SKELAXIN) 800 mg tablet Take 1 Tab by mouth three (3) times daily. 60 Tab 0    ketorolac (TORADOL) 15 mg/mL soln injection 4 mL by IntraMUSCular route once for 1 dose. 1 Vial 0    ondansetron (ZOFRAN ODT) 4 mg disintegrating tablet Take 1-2 tablets every 6-8 hours as needed for nausea and vomiting. 10 Tab 0    metFORMIN (GLUCOPHAGE) 1,000 mg tablet Take 1,000 mg by mouth two (2) times daily (with meals).  HYDROcodone-acetaminophen (NORCO) 5-325 mg per tablet       butalbital-acetaminophen-caffeine (FIORICET) -40 mg per tablet Take 1-2 tablets by mouth every 4 hours as needed for headache. Maximum dose 6 capsules daily. 12 Tab 0    PARoxetine (PAXIL) 20 mg tablet Take  by mouth daily. Allergies   Allergen Reactions    Bactrim [Sulfamethoprim Ds] Rash    Codeine Other (comments)     Upset stomach    Vicodin [Hydrocodone-Acetaminophen] Other (comments)     hallucinations         REVIEW OF SYSTEMS    Constitutional: Negative for fever, chills, or weight change. Respiratory: Negative for cough or shortness of breath. Cardiovascular: Negative for chest pain or palpitations. Gastrointestinal: Negative for acid reflux, change in bowel habits, or constipation. Genitourinary: Negative for dysuria and flank pain. Musculoskeletal: Positive for cervical and right shoulder pain. Skin: Negative for rash. Neurological: Positive for intermittent numbness in the right fingers. Positive for headaches. Negative for dizziness. Endo/Heme/Allergies: Negative for increased bruising. Psychiatric/Behavioral: Positive for difficulty with sleep. PHYSICAL EXAMINATION  Visit Vitals    /77    Pulse 94    Resp 14    Ht 5' 4\" (1.626 m)    Wt 235 lb 3.2 oz (106.7 kg)    BMI 40.37 kg/m2       Constitutional: Awake, alert, and in no acute distress  Neurological: 1+ symmetrical DTRs in the upper extremities.  1+ symmetrical DTRs in the lower extremities. Sensation to light touch is intact. Negative Cecilio's sign bilaterally. Skin: warm, dry, and intact. Musculoskeletal: Limited right cervical flexion. Limited cervical extension and good forward flexion. Diffuse tenderness to palpation, much more severe over the right upper trapezius and laterally over the right shoulder. Pain with abduction on the right to about 80 degrees. Positive Keith' and Neer's test on the right. Good ROM in the left shoulder. Positive empty can test on the right. Tenderness to palpation in the lower lumbar region. Moderate pain with extension and axial loading. Better with forward flexion. Moderate pain with internal rotation of her right hip. Negative straight leg raise bilaterally. Biceps  Triceps Deltoids Wrist Ext Wrist Flex Hand Intrin   Right -4/5 -4/5 -4/5 -4/5 -4/5 -4/5   Left +4/5 +4/5 +4/5 +4/5 +4/5 +4/5      Hip Flex  Quads Hamstrings Ankle DF EHL Ankle PF   Right +4/5 +4/5 +4/5 +4/5 +4/5 +4/5   Left +4/5 +4/5 +4/5 +4/5 +4/5 +4/5     IMAGING:    Cervical spine 2V x-rays from 08/24/2017 were personally reviewed with the Pt and demonstrated:  1) ACDF C5-6  2) Multilevel degenerative facets. Cervical spine MRI from 10/29/2009 was personally reviewed with the Pt and demonstrated:  IMPRESSION:  1. Anterior fusion/fixation C5-6. The dural sac and neural foramina are broad at these levels. 2. Small disc bulges C6-7 and C5-6 without significant narrowing to the dural sac. Written by Nicki Ambrosio, as dictated by Lilli Lazo MD.  I, Dr. Lilli Lazo confirm that all documentation is accurate.

## 2017-09-14 DIAGNOSIS — R29.898 RIGHT ARM WEAKNESS: ICD-10-CM

## 2017-09-14 DIAGNOSIS — M79.2 NEURITIS: ICD-10-CM

## 2017-09-14 DIAGNOSIS — M96.1 CERVICAL POST-LAMINECTOMY SYNDROME: ICD-10-CM

## 2017-10-27 ENCOUNTER — APPOINTMENT (OUTPATIENT)
Dept: GENERAL RADIOLOGY | Age: 57
End: 2017-10-27
Attending: EMERGENCY MEDICINE
Payer: MEDICARE

## 2017-10-27 ENCOUNTER — HOSPITAL ENCOUNTER (EMERGENCY)
Age: 57
Discharge: HOME | End: 2017-10-27
Attending: EMERGENCY MEDICINE
Payer: MEDICARE

## 2017-10-27 VITALS
RESPIRATION RATE: 15 BRPM | DIASTOLIC BLOOD PRESSURE: 47 MMHG | OXYGEN SATURATION: 100 % | HEART RATE: 93 BPM | TEMPERATURE: 97.7 F | SYSTOLIC BLOOD PRESSURE: 98 MMHG

## 2017-10-27 DIAGNOSIS — K92.2 GASTROINTESTINAL HEMORRHAGE, UNSPECIFIED GASTROINTESTINAL HEMORRHAGE TYPE: Primary | ICD-10-CM

## 2017-10-27 LAB
ABO + RH BLD: NORMAL
ALBUMIN SERPL-MCNC: 3.8 G/DL (ref 3.4–5)
ALBUMIN/GLOB SERPL: 1 {RATIO} (ref 0.8–1.7)
ALP SERPL-CCNC: 137 U/L (ref 45–117)
ALT SERPL-CCNC: 18 U/L (ref 13–56)
ANION GAP SERPL CALC-SCNC: 6 MMOL/L (ref 3–18)
APPEARANCE UR: CLEAR
AST SERPL-CCNC: 7 U/L (ref 15–37)
ATRIAL RATE: 100 BPM
BASOPHILS # BLD: 0 K/UL (ref 0–0.1)
BASOPHILS NFR BLD: 0 % (ref 0–2)
BILIRUB SERPL-MCNC: 0.3 MG/DL (ref 0.2–1)
BILIRUB UR QL: NEGATIVE
BLOOD GROUP ANTIBODIES SERPL: NORMAL
BUN SERPL-MCNC: 15 MG/DL (ref 7–18)
BUN/CREAT SERPL: 13 (ref 12–20)
CALCIUM SERPL-MCNC: 9.3 MG/DL (ref 8.5–10.1)
CALCULATED P AXIS, ECG09: 58 DEGREES
CALCULATED R AXIS, ECG10: 1 DEGREES
CALCULATED T AXIS, ECG11: 36 DEGREES
CHLORIDE SERPL-SCNC: 98 MMOL/L (ref 100–108)
CK MB CFR SERPL CALC: NORMAL % (ref 0–4)
CK MB SERPL-MCNC: <1 NG/ML (ref 5–25)
CK SERPL-CCNC: 54 U/L (ref 26–192)
CO2 SERPL-SCNC: 30 MMOL/L (ref 21–32)
COLOR UR: YELLOW
CREAT SERPL-MCNC: 1.14 MG/DL (ref 0.6–1.3)
DIAGNOSIS, 93000: NORMAL
DIFFERENTIAL METHOD BLD: ABNORMAL
EOSINOPHIL # BLD: 0.1 K/UL (ref 0–0.4)
EOSINOPHIL NFR BLD: 2 % (ref 0–5)
ERYTHROCYTE [DISTWIDTH] IN BLOOD BY AUTOMATED COUNT: 13.2 % (ref 11.6–14.5)
GLOBULIN SER CALC-MCNC: 3.7 G/DL (ref 2–4)
GLUCOSE BLD STRIP.AUTO-MCNC: 171 MG/DL (ref 70–110)
GLUCOSE SERPL-MCNC: 498 MG/DL (ref 74–99)
GLUCOSE UR STRIP.AUTO-MCNC: >1000 MG/DL
HCT VFR BLD AUTO: 39.5 % (ref 35–45)
HGB BLD-MCNC: 13.4 G/DL (ref 12–16)
HGB UR QL STRIP: NEGATIVE
KETONES UR QL STRIP.AUTO: NEGATIVE MG/DL
LACTATE BLD-SCNC: 1.1 MMOL/L (ref 0.4–2)
LEUKOCYTE ESTERASE UR QL STRIP.AUTO: NEGATIVE
LYMPHOCYTES # BLD: 2.4 K/UL (ref 0.9–3.6)
LYMPHOCYTES NFR BLD: 43 % (ref 21–52)
MCH RBC QN AUTO: 29.5 PG (ref 24–34)
MCHC RBC AUTO-ENTMCNC: 33.9 G/DL (ref 31–37)
MCV RBC AUTO: 87 FL (ref 74–97)
MONOCYTES # BLD: 0.4 K/UL (ref 0.05–1.2)
MONOCYTES NFR BLD: 8 % (ref 3–10)
NEUTS SEG # BLD: 2.6 K/UL (ref 1.8–8)
NEUTS SEG NFR BLD: 47 % (ref 40–73)
NITRITE UR QL STRIP.AUTO: NEGATIVE
P-R INTERVAL, ECG05: 140 MS
PH UR STRIP: 5 [PH] (ref 5–8)
PLATELET # BLD AUTO: 214 K/UL (ref 135–420)
PMV BLD AUTO: 11.9 FL (ref 9.2–11.8)
POTASSIUM SERPL-SCNC: 3.5 MMOL/L (ref 3.5–5.5)
PROT SERPL-MCNC: 7.5 G/DL (ref 6.4–8.2)
PROT UR STRIP-MCNC: NEGATIVE MG/DL
Q-T INTERVAL, ECG07: 360 MS
QRS DURATION, ECG06: 76 MS
QTC CALCULATION (BEZET), ECG08: 464 MS
RBC # BLD AUTO: 4.54 M/UL (ref 4.2–5.3)
SODIUM SERPL-SCNC: 134 MMOL/L (ref 136–145)
SP GR UR REFRACTOMETRY: >1.03 (ref 1–1.03)
SPECIMEN EXP DATE BLD: NORMAL
TROPONIN I SERPL-MCNC: <0.02 NG/ML (ref 0–0.04)
UROBILINOGEN UR QL STRIP.AUTO: 0.2 EU/DL (ref 0.2–1)
VENTRICULAR RATE, ECG03: 100 BPM
WBC # BLD AUTO: 5.5 K/UL (ref 4.6–13.2)

## 2017-10-27 PROCEDURE — 74011636637 HC RX REV CODE- 636/637: Performed by: EMERGENCY MEDICINE

## 2017-10-27 PROCEDURE — 83605 ASSAY OF LACTIC ACID: CPT

## 2017-10-27 PROCEDURE — 96375 TX/PRO/DX INJ NEW DRUG ADDON: CPT

## 2017-10-27 PROCEDURE — 99285 EMERGENCY DEPT VISIT HI MDM: CPT

## 2017-10-27 PROCEDURE — 71010 XR CHEST PORT: CPT

## 2017-10-27 PROCEDURE — 82550 ASSAY OF CK (CPK): CPT | Performed by: EMERGENCY MEDICINE

## 2017-10-27 PROCEDURE — 96361 HYDRATE IV INFUSION ADD-ON: CPT

## 2017-10-27 PROCEDURE — 81003 URINALYSIS AUTO W/O SCOPE: CPT | Performed by: EMERGENCY MEDICINE

## 2017-10-27 PROCEDURE — 86900 BLOOD TYPING SEROLOGIC ABO: CPT | Performed by: EMERGENCY MEDICINE

## 2017-10-27 PROCEDURE — 74011250636 HC RX REV CODE- 250/636: Performed by: EMERGENCY MEDICINE

## 2017-10-27 PROCEDURE — 85025 COMPLETE CBC W/AUTO DIFF WBC: CPT | Performed by: EMERGENCY MEDICINE

## 2017-10-27 PROCEDURE — 82962 GLUCOSE BLOOD TEST: CPT

## 2017-10-27 PROCEDURE — 96374 THER/PROPH/DIAG INJ IV PUSH: CPT

## 2017-10-27 PROCEDURE — 80053 COMPREHEN METABOLIC PANEL: CPT | Performed by: EMERGENCY MEDICINE

## 2017-10-27 PROCEDURE — 93005 ELECTROCARDIOGRAM TRACING: CPT

## 2017-10-27 RX ORDER — ONDANSETRON 2 MG/ML
4 INJECTION INTRAMUSCULAR; INTRAVENOUS
Status: COMPLETED | OUTPATIENT
Start: 2017-10-27 | End: 2017-10-27

## 2017-10-27 RX ORDER — NALBUPHINE HYDROCHLORIDE 10 MG/ML
10 INJECTION, SOLUTION INTRAMUSCULAR; INTRAVENOUS; SUBCUTANEOUS
Status: COMPLETED | OUTPATIENT
Start: 2017-10-27 | End: 2017-10-27

## 2017-10-27 RX ORDER — ONDANSETRON 2 MG/ML
INJECTION INTRAMUSCULAR; INTRAVENOUS
Status: DISCONTINUED
Start: 2017-10-27 | End: 2017-10-27 | Stop reason: HOSPADM

## 2017-10-27 RX ADMIN — NALBUPHINE HYDROCHLORIDE 10 MG: 10 INJECTION, SOLUTION INTRAMUSCULAR; INTRAVENOUS; SUBCUTANEOUS at 02:11

## 2017-10-27 RX ADMIN — ONDANSETRON 4 MG: 2 INJECTION INTRAMUSCULAR; INTRAVENOUS at 02:18

## 2017-10-27 RX ADMIN — SODIUM CHLORIDE 1000 ML: 900 INJECTION, SOLUTION INTRAVENOUS at 02:21

## 2017-10-27 RX ADMIN — INSULIN HUMAN 10 UNITS: 100 INJECTION, SOLUTION PARENTERAL at 02:19

## 2017-10-27 NOTE — ED NOTES
I have reviewed discharge instructions with the patient. The patient verbalized understanding. Patient armband removed and given to patient to take home. Patient was informed of the privacy risks if armband lost or stolen. I have reviewed the provider's instructions with the patient, answering all questions to her satisfaction.

## 2017-10-27 NOTE — ED TRIAGE NOTES
Pt arrived complaining of 2 episodes of blood in her stool, she reports nausea, epigastric pain and lethargy. Patient also complains of chest pressure, she denies any SOB at this time.

## 2017-10-27 NOTE — DISCHARGE INSTRUCTIONS
Gastrointestinal Bleeding: Care Instructions  Your Care Instructions    The digestive or gastrointestinal tract goes from the mouth to the anus. It is often called the GI tract. Bleeding can happen anywhere in the GI tract. It may be caused by an ulcer, an infection, or cancer. It may also be caused by medicines such as aspirin or ibuprofen. Light bleeding may not cause any symptoms at first. But if you continue to bleed for a while, you may feel very weak or tired. Sudden, heavy bleeding means you need to see a doctor right away. This kind of bleeding can be very dangerous. But it can usually be cured or controlled. The doctor may do some tests to find the cause of your bleeding. Follow-up care is a key part of your treatment and safety. Be sure to make and go to all appointments, and call your doctor if you are having problems. It's also a good idea to know your test results and keep a list of the medicines you take. How can you care for yourself at home? · Be safe with medicines. Take your medicines exactly as prescribed. Call your doctor if you think you are having a problem with your medicine. You will get more details on the specific medicines your doctor prescribes. · Do not take aspirin or other anti-inflammatory medicines, such as naproxen (Aleve) or ibuprofen (Advil, Motrin), without talking to your doctor first. Ask your doctor if it is okay to use acetaminophen (Tylenol). · Do not drink alcohol. · The bleeding may make you lose iron. So it's important to eat foods that have a lot of iron. These include red meat, shellfish, poultry, and eggs. They also include beans, raisins, whole-grain breads, and leafy green vegetables. If you want help planning meals, you can make an appointment with a dietitian. When should you call for help? Call 911 anytime you think you may need emergency care. For example, call if:  ? · You have sudden, severe belly pain.    ? · You vomit blood or what looks like coffee grounds. ? · You passed out (lost consciousness). ? · Your stools are maroon or very bloody. ?Call your doctor now or seek immediate medical care if:  ? · You are dizzy or lightheaded, or you feel like you may faint. ? · Your stools are black and look like tar, or they have streaks of blood. ? · You have belly pain. ? · You vomit or have nausea. ? · You have trouble swallowing, or it hurts when you swallow. ? Watch closely for changes in your health, and be sure to contact your doctor if:  ? · You do not get better as expected. Where can you learn more? Go to http://nancy-warren.info/. Enter P750 in the search box to learn more about \"Gastrointestinal Bleeding: Care Instructions. \"  Current as of: March 20, 2017  Content Version: 11.4  © 9162-3890 [employer]. Care instructions adapted under license by [employer] (which disclaims liability or warranty for this information). If you have questions about a medical condition or this instruction, always ask your healthcare professional. [address] any warranty or liability for your use of this information.

## 2017-10-27 NOTE — LETTER
[address] [place] [address] [facility] EMERGENCY DEPT 
[address] [facility] [id number] [phone] Work/School Note Date: 10/27/2017 To Whom It May concern: 
 
Magdalena Cintron was seen and treated today in the emergency room by the following provider(s): 
Attending Provider: Sonido Carey MD.   
 
James may return to work on 10/28/2017. Sincerely, Maryjane Nevarez RN

## 2017-10-27 NOTE — ED PROVIDER NOTES
HPI Comments: 1:45 AM Lori Israel is a 64 y.o. female with h/o GERD, H. Pylori (treated), and DM who presents to ED complaining of hematochezia and abdominal pain onset approximately 18 hours ago. The patient reports hematochezia x3 yesterday (morning, 1PM, and before bedtime). The patient reports blood when wiping and light blood in the toilet. She admits to epigastric and suprapubic pain and chills. She denies burning while urinating. She states her blood sugar has been running high recently with her highest blood sugar level being 453. The patient says she was treated for H. Pylori 3 years ago and states she had no ulcers. The patient is a former smoker. PCP: Chinmay Lance MD      The history is provided by the patient. Past Medical History:   Diagnosis Date    Brachial neuritis or radiculitis NOS 3/28/2011    Carpal tunnel syndrome on right     Cervicalgia 3/28/2011    Diabetes (HCC)     GERD (gastroesophageal reflux disease)     Hypertension     Neuralgia, neuritis, and radiculitis, unspecified 3/28/2011    Osteoarthritis     Pain in joint, shoulder region 3/28/2011    torn rotator cuff    Postlaminectomy syndrome, cervical region 3/28/2011       Past Surgical History:   Procedure Laterality Date    HX CERVICAL LAMINECTOMY  2008(?)    HX DILATION AND CURETTAGE      HX ORTHOPAEDIC      right carpal tunnel release, right shoulder arthroscopy & rotator cuff repair    HX ORTHOPAEDIC      HX ORTHOPAEDIC      right shoulder rotator cuff repair    HX SCOT AND BSO      HX TUBAL LIGATION           Family History:   Problem Relation Age of Onset    Diabetes Other     Hypertension Other     Other Other      CVA    Cancer Other      ovarian, prostate       Social History     Social History    Marital status:      Spouse name: N/A    Number of children: N/A    Years of education: N/A     Occupational History    Not on file.      Social History Main Topics    Smoking status: Former Smoker     Quit date: 3/28/1991    Smokeless tobacco: Never Used    Alcohol use 2.0 oz/week     4 Cans of beer per week      Comment: Occasionally    Drug use: No    Sexual activity: Not on file     Other Topics Concern    Not on file     Social History Narrative         ALLERGIES: Bactrim [sulfamethoprim ds]; Codeine; and Vicodin [hydrocodone-acetaminophen]    Review of Systems   Constitutional: Positive for chills. Negative for activity change and appetite change. HENT: Negative for congestion, ear discharge, ear pain, facial swelling, nosebleeds, postnasal drip, sinus pressure, sneezing and tinnitus. Eyes: Negative for pain, discharge, redness and visual disturbance. Respiratory: Negative for apnea, cough, choking, chest tightness, shortness of breath, wheezing and stridor. Cardiovascular: Negative for chest pain and leg swelling. Gastrointestinal: Positive for abdominal pain. Negative for abdominal distention, anal bleeding, blood in stool, constipation, diarrhea, nausea and vomiting. Genitourinary: Positive for hematuria. Negative for decreased urine volume, difficulty urinating, dyspareunia, dysuria, flank pain, frequency, pelvic pain, urgency, vaginal bleeding and vaginal discharge. Musculoskeletal: Negative for arthralgias, back pain, gait problem, joint swelling, myalgias, neck pain and neck stiffness. Skin: Negative for color change. Neurological: Negative for dizziness, tremors, seizures, speech difficulty, weakness, numbness and headaches. Hematological: Negative for adenopathy. Does not bruise/bleed easily. Psychiatric/Behavioral: Negative for agitation, dysphoric mood and self-injury. The patient is not nervous/anxious.         Vitals:    10/27/17 0130 10/27/17 0200 10/27/17 0230 10/27/17 0400   BP: 101/58 100/55 92/59 98/47   Pulse: 95 93 91 93   Resp: 17 17 16 15   Temp:       SpO2: 100% 100% 98% 100%            Physical Exam   Constitutional: She is oriented to person, place, and time. She appears well-developed and well-nourished. Overweight. HENT:   Head: Normocephalic and atraumatic. Right Ear: External ear normal.   Left Ear: External ear normal.   Nose: Nose normal.   Mouth/Throat: Oropharynx is clear and moist.   Eyes: Conjunctivae and EOM are normal. Pupils are equal, round, and reactive to light. Neck: Normal range of motion. Neck supple. Cardiovascular: Regular rhythm, normal heart sounds and intact distal pulses. Pulmonary/Chest: Effort normal and breath sounds normal. No respiratory distress. She has no wheezes. She has no rales. She exhibits no tenderness. Abdominal: Soft. Bowel sounds are normal. She exhibits no distension and no mass. There is no tenderness. There is no rebound and no guarding. Genitourinary: Rectal exam shows guaiac negative stool. Genitourinary Comments: Female chaperone and family members present during rectal exam.   Stool brown. Musculoskeletal: Normal range of motion. She exhibits no edema. Neurological: She is alert and oriented to person, place, and time. Skin: Skin is warm and dry. No rash noted. No erythema. Psychiatric: She has a normal mood and affect. Her behavior is normal. Judgment normal.   Nursing note and vitals reviewed. MDM  Number of Diagnoses or Management Options  Diagnosis management comments: This is a 64year old female presenting with a history of blood in the stools ,suprapubic mild soreness Blood in stools, with differential to include but not limited to Hemorrhoids, diverticular bleed, AVM, tumor, PUD, varices, blood dyscrasias, med effect, other etiologies. No history of having had a colonoscopy. I will begin the workup, obtain labs and diagnostics, treat as indicated and follow the the patient's condition and response.          Amount and/or Complexity of Data Reviewed  Clinical lab tests: ordered  Tests in the radiology section of CPT®: ordered and reviewed    Risk of Complications, Morbidity, and/or Mortality  Presenting problems: moderate      ED Course       Procedures      Vitals:  Patient Vitals for the past 12 hrs:   Temp Pulse Resp BP SpO2   10/27/17 0400 - 93 15 98/47 100 %   10/27/17 0230 - 91 16 92/59 98 %   10/27/17 0200 - 93 17 100/55 100 %   10/27/17 0130 - 95 17 101/58 100 %   10/27/17 0100 - 97 14 95/57 100 %   10/27/17 0050 97.7 °F (36.5 °C) 89 26 (!) 192/148 96 %       Medications ordered:   Medications   sodium chloride 0.9 % bolus infusion 1,000 mL (0 mL IntraVENous IV Completed 10/27/17 0321)   insulin regular (NOVOLIN R, HUMULIN R) injection 10 Units (10 Units IntraVENous Given 10/27/17 0219)   nalbuphine (NUBAIN) injection 10 mg (10 mg IntraVENous Given 10/27/17 0211)   ondansetron (ZOFRAN) injection 4 mg (4 mg IntraVENous Given 10/27/17 0218)         Lab findings:  Recent Results (from the past 12 hour(s))   TYPE & SCREEN    Collection Time: 10/27/17 12:50 AM   Result Value Ref Range    Crossmatch Expiration 10/30/2017     ABO/Rh(D) O POSITIVE     Antibody screen NEG    METABOLIC PANEL, COMPREHENSIVE    Collection Time: 10/27/17 12:50 AM   Result Value Ref Range    Sodium 134 (L) 136 - 145 mmol/L    Potassium 3.5 3.5 - 5.5 mmol/L    Chloride 98 (L) 100 - 108 mmol/L    CO2 30 21 - 32 mmol/L    Anion gap 6 3.0 - 18 mmol/L    Glucose 498 (HH) 74 - 99 mg/dL    BUN 15 7.0 - 18 MG/DL    Creatinine 1.14 0.6 - 1.3 MG/DL    BUN/Creatinine ratio 13 12 - 20      GFR est AA 60 (L) >60 ml/min/1.73m2    GFR est non-AA 49 (L) >60 ml/min/1.73m2    Calcium 9.3 8.5 - 10.1 MG/DL    Bilirubin, total 0.3 0.2 - 1.0 MG/DL    ALT (SGPT) 18 13 - 56 U/L    AST (SGOT) 7 (L) 15 - 37 U/L    Alk.  phosphatase 137 (H) 45 - 117 U/L    Protein, total 7.5 6.4 - 8.2 g/dL    Albumin 3.8 3.4 - 5.0 g/dL    Globulin 3.7 2.0 - 4.0 g/dL    A-G Ratio 1.0 0.8 - 1.7     CARDIAC PANEL,(CK, CKMB & TROPONIN)    Collection Time: 10/27/17 12:50 AM   Result Value Ref Range    CK 54 26 - 192 U/L    CK - MB <1.0 <3.6 ng/ml    CK-MB Index  0.0 - 4.0 %     CALCULATION NOT PERFORMED WHEN RESULT IS BELOW LINEAR LIMIT    Troponin-I, Qt. <0.02 0.0 - 0.045 NG/ML   CBC WITH AUTOMATED DIFF    Collection Time: 10/27/17 12:50 AM   Result Value Ref Range    WBC 5.5 4.6 - 13.2 K/uL    RBC 4.54 4.20 - 5.30 M/uL    HGB 13.4 12.0 - 16.0 g/dL    HCT 39.5 35.0 - 45.0 %    MCV 87.0 74.0 - 97.0 FL    MCH 29.5 24.0 - 34.0 PG    MCHC 33.9 31.0 - 37.0 g/dL    RDW 13.2 11.6 - 14.5 %    PLATELET 298 045 - 340 K/uL    MPV 11.9 (H) 9.2 - 11.8 FL    NEUTROPHILS 47 40 - 73 %    LYMPHOCYTES 43 21 - 52 %    MONOCYTES 8 3 - 10 %    EOSINOPHILS 2 0 - 5 %    BASOPHILS 0 0 - 2 %    ABS. NEUTROPHILS 2.6 1.8 - 8.0 K/UL    ABS. LYMPHOCYTES 2.4 0.9 - 3.6 K/UL    ABS. MONOCYTES 0.4 0.05 - 1.2 K/UL    ABS. EOSINOPHILS 0.1 0.0 - 0.4 K/UL    ABS.  BASOPHILS 0.0 0.0 - 0.1 K/UL    DF AUTOMATED     EKG, 12 LEAD, INITIAL    Collection Time: 10/27/17  1:02 AM   Result Value Ref Range    Ventricular Rate 100 BPM    Atrial Rate 100 BPM    P-R Interval 140 ms    QRS Duration 76 ms    Q-T Interval 360 ms    QTC Calculation (Bezet) 464 ms    Calculated P Axis 58 degrees    Calculated R Axis 1 degrees    Calculated T Axis 36 degrees    Diagnosis       Normal sinus rhythm  Possible Left atrial enlargement  Left ventricular hypertrophy  Abnormal ECG  When compared with ECG of 02-JUN-2017 15:07,  No significant change was found     GLUCOSE, POC    Collection Time: 10/27/17  4:04 AM   Result Value Ref Range    Glucose (POC) 171 (H) 70 - 110 mg/dL   URINALYSIS W/ RFLX MICROSCOPIC    Collection Time: 10/27/17  4:15 AM   Result Value Ref Range    Color YELLOW      Appearance CLEAR      Specific gravity >1.030 (H) 1.005 - 1.030    pH (UA) 5.0 5.0 - 8.0      Protein NEGATIVE  NEG mg/dL    Glucose >1000 (A) NEG mg/dL    Ketone NEGATIVE  NEG mg/dL    Bilirubin NEGATIVE  NEG      Blood NEGATIVE  NEG      Urobilinogen 0.2 0.2 - 1.0 EU/dL    Nitrites NEGATIVE  NEG      Leukocyte Esterase NEGATIVE  NEG     POC LACTIC ACID    Collection Time: 10/27/17  4:21 AM   Result Value Ref Range    Lactic Acid (POC) 1.1 0.4 - 2.0 mmol/L       EKG interpretation by ED Physician:  0102 NSR at a rate of 100bpm. Possible left atrial enlargement. Left ventricular hypertrophy. X-Ray, CT or other radiology findings or impressions:  XR CHEST PORT       Interpretation by Dr. John Kumar, ED physician:  Negative. No acute finding    Progress notes, Consult notes or additional Procedure notes:   1:46 AM Rectal exam was performed with a female chaperone, RN Cecy Zapien, and family present. Reevaluation of patient:   I have reassessed the patient. Patient has no bleeding and has no pain. The patient can be discharged with instructions for follow up with Dr. Ketty Odonnell, gastrointestinal.      Disposition:  Diagnosis:   1. Gastrointestinal hemorrhage, unspecified gastrointestinal hemorrhage type        Disposition: Discharge. Follow-up Information     Follow up With Details Comments Contact Info    [facility] EMERGENCY DEPT  As needed, If symptoms worsen [address] [place] [address] Jose F Wells MD Call [address]  [address]  [address] [place] [address]  [phone]             Patient's Medications   Start Taking    No medications on file   Continue Taking    ATORVASTATIN (LIPITOR) 40 MG TABLET    Take 40 mg by mouth daily. BUTALBITAL-ACETAMINOPHEN-CAFFEINE (FIORICET) -40 MG PER TABLET    Take 1-2 tablets by mouth every 4 hours as needed for headache. Maximum dose 6 capsules daily. DULOXETINE (CYMBALTA) 30 MG CAPSULE    Take 1 Cap by mouth daily. DULOXETINE (CYMBALTA) 60 MG CAPSULE    Take 1 Cap by mouth daily. GABAPENTIN (NEURONTIN) 300 MG CAPSULE    Take 2 Caps by mouth three (3) times daily. HYDROCODONE-ACETAMINOPHEN (NORCO) 5-325 MG PER TABLET        METAXALONE (SKELAXIN) 800 MG TABLET    Take 1 Tab by mouth three (3) times daily.     METFORMIN (GLUCOPHAGE) 1,000 MG TABLET    Take 1,000 mg by mouth two (2) times daily (with meals). ONDANSETRON (ZOFRAN ODT) 4 MG DISINTEGRATING TABLET    Take 1-2 tablets every 6-8 hours as needed for nausea and vomiting. PAROXETINE (PAXIL) 20 MG TABLET    Take  by mouth daily. SERTRALINE (ZOLOFT) 50 MG TABLET    Take 50 mg by mouth daily. VALSARTAN-HYDROCHLOROTHIAZIDE (DIOVAN-HCT) 160-12.5 MG PER TABLET    Take 1 Tab by mouth daily. These Medications have changed    No medications on file   Stop Taking    No medications on file         Scribe Layton Segal acting as a scribe for and in the presence of Syed Turner MD      October 27, 2017 at 1:45 AM       Provider Attestation:      I personally performed the services described in the documentation, reviewed the documentation, as recorded by the scribe in my presence, and it accurately and completely records my words and actions.  October 27, 2017 at 1:45 AM - Syed Turner MD

## 2017-10-27 NOTE — LETTER
[address] [place] [address] Dr [facility] EMERGENCY DEPT 
[address] [facility] [address] 
[phone] Work/School Note Date: 10/27/2017 To Whom It May concern: 
 
Keith Ferrera was seen and treated today in the emergency room by the following provider(s): 
Attending Provider: Valerie Landers MD. Keith Ferrera Special Instructions:  Please excuse from PT until released by PCP Sincerely, GRZEGORZ Andrews MD

## 2017-12-13 ENCOUNTER — DOCUMENTATION ONLY (OUTPATIENT)
Dept: ORTHOPEDIC SURGERY | Age: 57
End: 2017-12-13

## 2017-12-13 NOTE — PROGRESS NOTES
I have spoken to Randall Yung at [employer], [facility], regarding MRI. It was overlooked. He will process it with [facility] tomorrow.

## 2017-12-14 ENCOUNTER — TELEPHONE (OUTPATIENT)
Dept: ORTHOPEDIC SURGERY | Age: 57
End: 2017-12-14

## 2017-12-14 ENCOUNTER — OFFICE VISIT (OUTPATIENT)
Dept: ORTHOPEDIC SURGERY | Age: 57
End: 2017-12-14

## 2017-12-14 VITALS
RESPIRATION RATE: 14 BRPM | HEART RATE: 99 BPM | BODY MASS INDEX: 39.22 KG/M2 | HEIGHT: 65 IN | WEIGHT: 235.4 LBS | DIASTOLIC BLOOD PRESSURE: 78 MMHG | SYSTOLIC BLOOD PRESSURE: 148 MMHG

## 2017-12-14 DIAGNOSIS — M96.1 POSTLAMINECTOMY SYNDROME, CERVICAL REGION: Primary | Chronic | ICD-10-CM

## 2017-12-14 DIAGNOSIS — M79.2 NEURITIS: ICD-10-CM

## 2017-12-14 DIAGNOSIS — M62.838 MUSCLE SPASM: ICD-10-CM

## 2017-12-14 DIAGNOSIS — M54.2 CERVICALGIA: Chronic | ICD-10-CM

## 2017-12-14 DIAGNOSIS — M25.511 CHRONIC RIGHT SHOULDER PAIN: ICD-10-CM

## 2017-12-14 DIAGNOSIS — M79.18 MYOFASCIAL PAIN: ICD-10-CM

## 2017-12-14 DIAGNOSIS — G89.29 CHRONIC RIGHT SHOULDER PAIN: ICD-10-CM

## 2017-12-14 RX ORDER — DULOXETIN HYDROCHLORIDE 30 MG/1
30 CAPSULE, DELAYED RELEASE ORAL DAILY
Qty: 7 CAP | Refills: 0 | Status: SHIPPED | OUTPATIENT
Start: 2017-12-14

## 2017-12-14 RX ORDER — METAXALONE 800 MG/1
800 TABLET ORAL 3 TIMES DAILY
Qty: 60 TAB | Refills: 0 | Status: SHIPPED | OUTPATIENT
Start: 2017-12-14

## 2017-12-14 NOTE — TELEPHONE ENCOUNTER
While attempting to schedule a MRI of the Cervical Spine, it has come to my attention Ms. Chanelle Duke is extremely claustrophic. The only medications which have worked in the past are ativan injection or propofol. Would it be better to order the MRI under anesthesia? Please check with Dr Birmingham to see how she would like to proceed. Thank you.

## 2017-12-14 NOTE — PATIENT INSTRUCTIONS
Neck Arthritis: Exercises  Your Care Instructions  Here are some examples of typical rehabilitation exercises for your condition. Start each exercise slowly. Ease off the exercise if you start to have pain. Your doctor or physical therapist will tell you when you can start these exercises and which ones will work best for you. How to do the exercises  Neck stretches to the side    1. This stretch works best if you keep your shoulder down as you lean away from it. To help you remember to do this, start by relaxing your shoulders and lightly holding on to your thighs or your chair. 2. Tilt your head toward your shoulder and hold for 15 to 30 seconds. Let the weight of your head stretch your muscles. 3. Repeat 2 to 4 times toward each shoulder. Chin tuck    1. Lie on the floor with a rolled-up towel under your neck. Your head should be touching the floor. 2. Slowly bring your chin toward your chest.  3. Hold for a count of 6, and then relax for up to 10 seconds. 4. Repeat 8 to 12 times. Active cervical rotation    1. Sit in a firm chair, or stand up straight. 2. Keeping your chin level, turn your head to the right, and hold for 15 to 30 seconds. 3. Turn your head to the left and hold for 15 to 30 seconds. 4. Repeat 2 to 4 times to each side. Shoulder blade squeeze    1. While standing, squeeze your shoulder blades together. 2. Do not raise your shoulders up as you are squeezing. 3. Hold for 6 seconds. 4. Repeat 8 to 12 times. Shoulder rolls    1. Sit comfortably with your feet shoulder-width apart. You can also do this exercise standing up. 2. Roll your shoulders up, then back, and then down in a smooth, circular motion. 3. Repeat 2 to 4 times. Follow-up care is a key part of your treatment and safety. Be sure to make and go to all appointments, and call your doctor if you are having problems. It's also a good idea to know your test results and keep a list of the medicines you take.   Where can you learn more? Go to http://nancy-warren.info/. Enter O555 in the search box to learn more about \"Neck Arthritis: Exercises. \"  Current as of: March 21, 2017  Content Version: 11.4  © 4678-6887 [employer]. Care instructions adapted under license by [employer] (which disclaims liability or warranty for this information). If you have questions about a medical condition or this instruction, always ask your healthcare professional. [address] any warranty or liability for your use of this information. Shoulder Arthritis: Exercises  Your Care Instructions  Here are some examples of typical rehabilitation exercises for your condition. Start each exercise slowly. Ease off the exercise if you start to have pain. Your doctor or physical therapist will tell you when you can start these exercises and which ones will work best for you. How to do the exercises  Shoulder flexion (lying down)    To make a wand for this exercise, use a piece of PVC pipe or a broom handle with the broom removed. Make the wand about a foot wider than your shoulders. 4. Lie on your back, holding a wand with both hands. Your palms should face down as you hold the wand. 5. Keeping your elbows straight, slowly raise your arms over your head. Raise them until you feel a stretch in your shoulders, upper back, and chest.  6. Hold for 15 to 30 seconds. 7. Repeat 2 to 4 times. Shoulder rotation (lying down)    To make a wand for this exercise, use a piece of PVC pipe or a broom handle with the broom removed. Make the wand about a foot wider than your shoulders. 5. Lie on your back. Hold a wand with both hands with your elbows bent and palms up. 6. Keep your elbows close to your body, and move the wand across your body toward the sore arm. 7. Hold for 8 to 12 seconds. 8. Repeat 2 to 4 times. Shoulder internal rotation with towel    5.  Hold a towel above and behind your head with the arm that is not sore. 6. With your sore arm, reach behind your back and grasp the towel. 7. With the arm above your head, pull the towel upward. Do this until you feel a stretch on the front and outside of your sore shoulder. 8. Hold 15 to 30 seconds. 9. Repeat 2 to 4 times. Shoulder blade squeeze    5. Stand with your arms at your sides, and squeeze your shoulder blades together. Do not raise your shoulders up as you squeeze. 6. Hold 6 seconds. 7. Repeat 8 to 12 times. Resisted rows    For this exercise, you will need elastic exercise material, such as surgical tubing or Thera-Band. 4. Put the band around a solid object at about waist level. (A bedpost will work well.) Each hand should hold an end of the band. 5. With your elbows at your sides and bent to 90 degrees, pull the band back. Your shoulder blades should move toward each other. Return to the starting position. 6. Repeat 8 to 12 times. External rotator strengthening exercise    1. Start by tying a piece of elastic exercise material to a doorknob. You can use surgical tubing or Thera-Band. (You may also hold one end of the band in each hand.)  2. Stand or sit with your shoulder relaxed and your elbow bent 90 degrees. Your upper arm should rest comfortably against your side. Squeeze a rolled towel between your elbow and your body for comfort. This will help keep your arm at your side. 3. Hold one end of the elastic band with the hand of the painful arm. 4. Start with your forearm across your belly. Slowly rotate the forearm out away from your body. Keep your elbow and upper arm tucked against the towel roll or the side of your body until you begin to feel tightness in your shoulder. Slowly move your arm back to where you started. 5. Repeat 8 to 12 times. Internal rotator strengthening exercise    1. Start by tying a piece of elastic exercise material to a doorknob. You can use surgical tubing or Thera-Band.   2. Stand or sit with your shoulder relaxed and your elbow bent 90 degrees. Your upper arm should rest comfortably against your side. Squeeze a rolled towel between your elbow and your body for comfort. This will help keep your arm at your side. 3. Hold one end of the elastic band in the hand of the painful arm. 4. Slowly rotate your forearm toward your body until it touches your belly. Slowly move it back to where you started. 5. Keep your elbow and upper arm firmly tucked against the towel roll or at your side. 6. Repeat 8 to 12 times. Pendulum swing    If you have pain in your back, do not do this exercise. 1. Hold on to a table or the back of a chair with your good arm. Then bend forward a little and let your sore arm hang straight down. This exercise does not use the arm muscles. Rather, use your legs and your hips to create movement that makes your arm swing freely. 2. Use the movement from your hips and legs to guide the slightly swinging arm back and forth like a pendulum (or elephant trunk). Then guide it in circles that start small (about the size of a dinner plate). Make the circles a bit larger each day, as your pain allows. 3. Do this exercise for 5 minutes, 5 to 7 times each day. 4. As you have less pain, try bending over a little farther to do this exercise. This will increase the amount of movement at your shoulder. Follow-up care is a key part of your treatment and safety. Be sure to make and go to all appointments, and call your doctor if you are having problems. It's also a good idea to know your test results and keep a list of the medicines you take. Where can you learn more? Go to http://nancy-warren.info/. Enter H562 in the search box to learn more about \"Shoulder Arthritis: Exercises. \"  Current as of: March 21, 2017  Content Version: 11.4  © 9932-7209 [employer].  Care instructions adapted under license by [employer] (which disclaims liability or warranty for this information). If you have questions about a medical condition or this instruction, always ask your healthcare professional. [place] [address] any warranty or liability for your use of this information.

## 2017-12-14 NOTE — PROGRESS NOTES
[facility]  Samantha Trinidad., [address], [facility] [address]  Phone: [phone]  Fax: [phone]      ASSESSMENT   Diagnoses and all orders for this visit:    1. Postlaminectomy syndrome, cervical region    2. Myofascial pain  -     DULoxetine (CYMBALTA) 30 mg capsule; Take 1 Cap by mouth daily. 3. Cervicalgia    4. Neuritis    5. Chronic right shoulder pain  -     DULoxetine (CYMBALTA) 30 mg capsule; Take 1 Cap by mouth daily. 6. Muscle spasm  -     metaxalone (SKELAXIN) 800 mg tablet; Take 1 Tab by mouth three (3) times daily. Indications: Muscle Spasm         IMPRESSION AND PLAN:  Sabine Lua is a 62 y.o. right hand dominant female with history of cervical and right shoulder pain. 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 She complains of pain in the neck and right shoulder and had cervical surgery with Dr. Emelina Soliman in 2008. Pt discontinued Cymbalta mg after she was prescribed Ambien by her PCP and was concerned about drug interaction. 1) Pt was given information on cervical and shoulder arthritis exercises. 2) She was given information on a spinal cord stimulator. 3) Pt was also given information on how to use a TheraCane. 4) She will restart Cymbalta 30 mg 1 tab daily with food. Pt will then increase to Cymbalta 60 mg 1 tab daily with food. 5) A cervical MRI was ordered. She has right arm weakness and has tried cervical physical therapy. 6) She was prescribed Skelaxin 800 mg 1 tab TID. 7) Ms. Sonali Tee has a reminder for a \"due or due soon\" health maintenance. I have asked that she contact her primary care provider, Lucius Montana MD, for follow-up on this health maintenance. 8)  demonstrated consistency with prescribing. 9) Pt will follow-up in 4-6 weeks or sooner if needed.       HISTORY OF PRESENT ILLNESS:  Sabine Lua is a 62 y.o. right hand dominant female with history of cervical and right shoulder pain. 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 She states that another nurse was helping her lift a patient. The other nurse let go of the patient, which transferred all the weight to Ms. Ashely Rizvi and caused her to twist and tear her right rotator cuff. She had cervical surgery in 2008 with Dr. Murphy Mora. Pt reports frequently cracking and popping noise in the neck. She has been to [facility] where she was followed by Dr. Chris Roche. Pt reports that Dr. Murphy Mora gave her information on a spinal cord stimulator years ago. She admits that she experiences headaches daily. Pt was ordered a cervical MRI at her last office visit and it was approved by workman's compensation yesterday. She discontinued Cymbalta mg after she was prescribed Ambien by her PCP and was concerned about drug interaction. Pt reports that she was taking Cymbalta 30 mg and had not yet increased to 60 mg prior to discontinuing the medication. She admits to difficulty finding a comfortable position at night which interferes with her sleep. Pt has tried physical therapy in the past without dry needling since she was sent to physical therapy in [place], [place] by [facility]. She increased her Neurontin 400 mg to 2 tabs TID with no significant improvement in her symptoms and without sedation. Pt was prescribed Skelaxin 800 mg at her last office visit but never received it. She reports that she no longer takes Paxil. Pt at this time desires to proceed with cervical MRI.       Pain Scale: 8/10    PCP: Arlette Hess MD       Past Medical History:   Diagnosis Date    Brachial neuritis or radiculitis NOS 3/28/2011    Carpal tunnel syndrome on right     Cervicalgia 3/28/2011    Diabetes ([facility] [address]     GERD (gastroesophageal reflux disease)     Hypertension     Neuralgia, neuritis, and radiculitis, unspecified 3/28/2011    Osteoarthritis     Pain in joint, shoulder region 3/28/2011 torn rotator cuff    Postlaminectomy syndrome, cervical region 3/28/2011        Social History     Social History    Marital status:      Spouse name: N/A    Number of children: N/A    Years of education: N/A     Occupational History    Not on file. Social History Main Topics    Smoking status: Former Smoker     Quit date: 3/28/1991    Smokeless tobacco: Never Used    Alcohol use 2.0 oz/week     4 Cans of beer per week      Comment: Occasionally    Drug use: No    Sexual activity: Not on file     Other Topics Concern    Not on file     Social History Narrative       Current Outpatient Prescriptions   Medication Sig Dispense Refill    DULoxetine (CYMBALTA) 30 mg capsule Take 1 Cap by mouth daily. 7 Cap 0    metaxalone (SKELAXIN) 800 mg tablet Take 1 Tab by mouth three (3) times daily. Indications: Muscle Spasm 60 Tab 0    zolpidem (AMBIEN) 10 mg tablet Take 10 mg by mouth nightly as needed for Sleep.  insulin aspart (NOVOLOG FLEXPEN) 100 unit/mL inpn 100 Units by SubCUTAneous route Before breakfast, lunch, and dinner.  insulin glargine (LANTUS SOLOSTAR) 100 unit/mL (3 mL) inpn 25 Units by SubCUTAneous route nightly.  atorvastatin (LIPITOR) 40 mg tablet Take 40 mg by mouth daily.  valsartan-hydroCHLOROthiazide (DIOVAN-HCT) 160-12.5 mg per tablet Take 1 Tab by mouth daily.  gabapentin (NEURONTIN) 300 mg capsule Take 2 Caps by mouth three (3) times daily. 180 Cap 1    ondansetron (ZOFRAN ODT) 4 mg disintegrating tablet Take 1-2 tablets every 6-8 hours as needed for nausea and vomiting. 10 Tab 0    metFORMIN (GLUCOPHAGE) 1,000 mg tablet Take 1,000 mg by mouth two (2) times daily (with meals).  DULoxetine (CYMBALTA) 60 mg capsule Take 1 Cap by mouth daily. 30 Cap 1    butalbital-acetaminophen-caffeine (FIORICET) -40 mg per tablet Take 1-2 tablets by mouth every 4 hours as needed for headache. Maximum dose 6 capsules daily.  12 Tab 0       Allergies   Allergen Reactions    Bactrim [Sulfamethoprim Ds] Rash    Codeine Other (comments)     Upset stomach    Sulfa (Sulfonamide Antibiotics) Other (comments)     Blisters brusing platelets down    Vicodin [Hydrocodone-Acetaminophen] Other (comments)     hallucinations         REVIEW OF SYSTEMS    Constitutional: Negative for fever, chills, or weight change. Respiratory: Negative for cough or shortness of breath. Cardiovascular: Negative for chest pain or palpitations. Gastrointestinal: Negative for acid reflux, change in bowel habits, or constipation. Genitourinary: Negative for dysuria and flank pain. Musculoskeletal: Positive for cervical pain and right arm weakness. Skin: Negative for rash. Neurological: Positive for headaches. Negative for dizziness or numbness. Endo/Heme/Allergies: Negative for increased bruising. Psychiatric/Behavioral: Positive for difficulty with sleep. PHYSICAL EXAMINATION  Visit Vitals    /78    Pulse 99    Resp 14    Ht 5' 4.5\" (1.638 m)    Wt 235 lb 6.4 oz (106.8 kg)    BMI 39.78 kg/m2       Constitutional: Awake, alert, and in no acute distress. Neurological: 1+ symmetrical DTRs in the upper extremities. Sensation to light touch is intact. Negative Cecilio's sign bilaterally. Skin: warm, dry, and intact. Musculoskeletal: Pain with abduction of the right shoulder. Positive Chester Gab' test on the right. Good range of motion of the left shoulder. Tight across the upper trapezius. No pain with extension, axial loading, or forward flexion. No pain with internal or external rotation of her hips. Negative straight leg raise bilaterally.       Biceps  Triceps Deltoids Wrist Ext Wrist Flex Hand Intrin   Right 4/5 4/5 4/5 +4/5 +4/5 +4/5   Left +4/5 +4/5 +4/5 +4/5 +4/5 +4/5     IMAGING:    Cervical spine 2V x-rays from 08/24/2017 were personally reviewed with the Pt and demonstrated:  1) ACDF C5-6  2) Multilevel degenerative facets.      Cervical spine MRI from 10/29/2009 was personally reviewed with the Pt and demonstrated:  IMPRESSION:  1. Anterior fusion/fixation C5-6. The dural sac and neural foramina are broad at these levels. 2. Small disc bulges C6-7 and C5-6 without significant narrowing to the dural sac. Written by Marcela Alexander, as dictated by Juan Smiley MD.  I, Dr. Juan Smiley confirm that all documentation is accurate.

## 2017-12-19 ENCOUNTER — TELEPHONE (OUTPATIENT)
Dept: ORTHOPEDIC SURGERY | Age: 57
End: 2017-12-19

## 2017-12-19 NOTE — TELEPHONE ENCOUNTER
[facility] called in states that she was trying to schedule the patient for her MRI and she stated the patient would need anesthesia. She stated that she was going to schedule the patient at a hospital, just wanted to let Dr. Birmingham know.

## 2018-01-11 ENCOUNTER — HOSPITAL ENCOUNTER (OUTPATIENT)
Dept: MRI IMAGING | Age: 58
Discharge: HOME | End: 2018-01-11
Attending: PHYSICAL MEDICINE & REHABILITATION

## 2018-01-29 ENCOUNTER — HOSPITAL ENCOUNTER (OUTPATIENT)
Dept: MRI IMAGING | Age: 58
Discharge: HOME | End: 2018-01-29
Attending: PHYSICAL MEDICINE & REHABILITATION

## 2018-02-07 ENCOUNTER — TELEPHONE (OUTPATIENT)
Dept: ORTHOPEDIC SURGERY | Age: 58
End: 2018-02-07

## 2018-02-07 DIAGNOSIS — M79.2 NEURITIS: ICD-10-CM

## 2018-02-07 DIAGNOSIS — M25.511 CHRONIC RIGHT SHOULDER PAIN: ICD-10-CM

## 2018-02-07 DIAGNOSIS — G89.29 CHRONIC RIGHT SHOULDER PAIN: ICD-10-CM

## 2018-02-07 RX ORDER — GABAPENTIN 300 MG/1
CAPSULE ORAL
Qty: 180 CAP | Refills: 0 | Status: SHIPPED | OUTPATIENT
Start: 2018-02-07

## 2018-02-14 ENCOUNTER — ANESTHESIA EVENT (OUTPATIENT)
Dept: MRI IMAGING | Age: 58
End: 2018-02-14
Payer: OTHER MISCELLANEOUS

## 2018-02-14 ENCOUNTER — ANESTHESIA (OUTPATIENT)
Dept: MRI IMAGING | Age: 58
End: 2018-02-14
Payer: OTHER MISCELLANEOUS

## 2018-02-14 ENCOUNTER — HOSPITAL ENCOUNTER (OUTPATIENT)
Dept: MRI IMAGING | Age: 58
Discharge: HOME | End: 2018-02-14
Attending: RADIOLOGY | Admitting: RADIOLOGY
Payer: OTHER MISCELLANEOUS

## 2018-02-14 VITALS
SYSTOLIC BLOOD PRESSURE: 138 MMHG | TEMPERATURE: 97.2 F | DIASTOLIC BLOOD PRESSURE: 88 MMHG | RESPIRATION RATE: 16 BRPM | BODY MASS INDEX: 40.46 KG/M2 | WEIGHT: 237 LBS | HEIGHT: 64 IN | HEART RATE: 91 BPM | OXYGEN SATURATION: 100 %

## 2018-02-14 DIAGNOSIS — M79.2 NEURALGIA AND NEURITIS: ICD-10-CM

## 2018-02-14 DIAGNOSIS — M96.1 POSTLAMINECTOMY SYNDROME, NOT ELSEWHERE CLASSIFIED: ICD-10-CM

## 2018-02-14 DIAGNOSIS — R29.898 OTHER SYMPTOMS AND SIGNS INVOLVING THE MUSCULOSKELETAL SYSTEM: ICD-10-CM

## 2018-02-14 LAB
GLUCOSE BLD STRIP.AUTO-MCNC: 104 MG/DL (ref 70–110)
GLUCOSE BLD STRIP.AUTO-MCNC: 146 MG/DL (ref 70–110)

## 2018-02-14 PROCEDURE — 74011250636 HC RX REV CODE- 250/636

## 2018-02-14 PROCEDURE — 76210000021 HC REC RM PH II 0.5 TO 1 HR

## 2018-02-14 PROCEDURE — 72141 MRI NECK SPINE W/O DYE: CPT

## 2018-02-14 PROCEDURE — 76060000034 HC ANESTHESIA 1.5 TO 2 HR

## 2018-02-14 PROCEDURE — 74011250636 HC RX REV CODE- 250/636: Performed by: ANESTHESIOLOGY

## 2018-02-14 PROCEDURE — 82962 GLUCOSE BLOOD TEST: CPT

## 2018-02-14 RX ORDER — MIDAZOLAM HYDROCHLORIDE 1 MG/ML
INJECTION, SOLUTION INTRAMUSCULAR; INTRAVENOUS AS NEEDED
Status: DISCONTINUED | OUTPATIENT
Start: 2018-02-14 | End: 2018-02-14 | Stop reason: HOSPADM

## 2018-02-14 RX ORDER — PROPOFOL 10 MG/ML
INJECTION, EMULSION INTRAVENOUS
Status: DISCONTINUED | OUTPATIENT
Start: 2018-02-14 | End: 2018-02-14 | Stop reason: HOSPADM

## 2018-02-14 RX ORDER — FENTANYL CITRATE 50 UG/ML
INJECTION, SOLUTION INTRAMUSCULAR; INTRAVENOUS AS NEEDED
Status: DISCONTINUED | OUTPATIENT
Start: 2018-02-14 | End: 2018-02-14 | Stop reason: HOSPADM

## 2018-02-14 RX ORDER — SODIUM CHLORIDE, SODIUM LACTATE, POTASSIUM CHLORIDE, CALCIUM CHLORIDE 600; 310; 30; 20 MG/100ML; MG/100ML; MG/100ML; MG/100ML
INJECTION, SOLUTION INTRAVENOUS
Status: DISCONTINUED | OUTPATIENT
Start: 2018-02-14 | End: 2018-02-14 | Stop reason: HOSPADM

## 2018-02-14 RX ORDER — ONDANSETRON 2 MG/ML
4 INJECTION INTRAMUSCULAR; INTRAVENOUS ONCE
Status: COMPLETED | OUTPATIENT
Start: 2018-02-14 | End: 2018-02-14

## 2018-02-14 RX ADMIN — SODIUM CHLORIDE, SODIUM LACTATE, POTASSIUM CHLORIDE, CALCIUM CHLORIDE: 600; 310; 30; 20 INJECTION, SOLUTION INTRAVENOUS at 13:39

## 2018-02-14 RX ADMIN — ONDANSETRON 4 MG: 2 INJECTION INTRAMUSCULAR; INTRAVENOUS at 16:38

## 2018-02-14 RX ADMIN — PROPOFOL 50 MCG/KG/MIN: 10 INJECTION, EMULSION INTRAVENOUS at 14:58

## 2018-02-14 RX ADMIN — FENTANYL CITRATE 50 MCG: 50 INJECTION, SOLUTION INTRAMUSCULAR; INTRAVENOUS at 13:35

## 2018-02-14 RX ADMIN — PROPOFOL 25 MCG/KG/MIN: 10 INJECTION, EMULSION INTRAVENOUS at 13:32

## 2018-02-14 RX ADMIN — MIDAZOLAM HYDROCHLORIDE 4 MG: 1 INJECTION, SOLUTION INTRAMUSCULAR; INTRAVENOUS at 13:30

## 2018-02-14 RX ADMIN — FENTANYL CITRATE 50 MCG: 50 INJECTION, SOLUTION INTRAMUSCULAR; INTRAVENOUS at 13:33

## 2018-02-14 NOTE — IP AVS SNAPSHOT
Mirela Pillai 
 
 
 [address] Patient: Bakari Hernandez MRN: [id number] WOI:[id number] About your hospitalization You were admitted on:  February 14, 2018 You last received care in the:  [facility] You were discharged on:  February 14, 2018 Why you were hospitalized Your primary diagnosis was:  Not on File Follow-up Information Follow up With Details Comments Contact Info Keyana Pedersen MD   [address] [facility] [address] [facility] 
[phone] Your Scheduled Appointments Thursday February 22, 2018  9:45 AM EST DIAG TEST F/U with Thaddeus Galeana MD  
[facility] - [facility] ([facility]) [address]  
[phone] Discharge Orders None A check robert indicates which time of day the medication should be taken. My Medications ASK your doctor about these medications Instructions Each Dose to Equal  
 Morning Noon Evening Bedtime  
 atorvastatin 40 mg tablet Commonly known as:  LIPITOR Your last dose was: Your next dose is: Take 40 mg by mouth daily. 40 mg  
    
   
   
   
  
 butalbital-acetaminophen-caffeine -40 mg per tablet Commonly known as:  [employer] Your last dose was: Your next dose is: Take 1-2 tablets by mouth every 4 hours as needed for headache. Maximum dose 6 capsules daily. * DULoxetine 60 mg capsule Commonly known as:  CYMBALTA Your last dose was: Your next dose is: Take 1 Cap by mouth daily. 60 mg  
    
   
   
   
  
 * DULoxetine 30 mg capsule Commonly known as:  CYMBALTA Your last dose was: Your next dose is: Take 1 Cap by mouth daily. 30 mg  
    
   
   
   
  
 gabapentin 300 mg capsule Commonly known as:  NEURONTIN Your last dose was: Your next dose is: TAKE TWO CAPSULES BY MOUTH THREE TIMES DAILY  
     
   
   
   
  
 LANTUS SOLOSTAR 100 unit/mL (3 mL) Inpn Generic drug:  insulin glargine Your last dose was: Your next dose is:    
   
   
 25 Units by SubCUTAneous route nightly. 25 Units  
    
   
   
   
  
 metaxalone 800 mg tablet Commonly known as:  SKELAXIN Your last dose was: Your next dose is: Take 1 Tab by mouth three (3) times daily. Indications: Muscle Spasm 800 mg  
    
   
   
   
  
 metFORMIN 1,000 mg tablet Commonly known as:  GLUCOPHAGE Your last dose was: Your next dose is: Take 1,000 mg by mouth two (2) times daily (with meals). 1000 mg NovoLOG Flexpen 100 unit/mL Inpn Generic drug:  insulin aspart Your last dose was: Your next dose is:    
   
   
 100 Units by SubCUTAneous route Before breakfast, lunch, and dinner. 100 Units  
    
   
   
   
  
 ondansetron 4 mg disintegrating tablet Commonly known as:  ZOFRAN ODT Your last dose was: Your next dose is: Take 1-2 tablets every 6-8 hours as needed for nausea and vomiting.  
     
   
   
   
  
 valsartan-hydroCHLOROthiazide 160-12.5 mg per tablet Commonly known as:  DIOVAN-HCT Your last dose was: Your next dose is: Take 1 Tab by mouth daily. 1 Tab  
    
   
   
   
  
 zolpidem 10 mg tablet Commonly known as:  AMBIEN Your last dose was: Your next dose is: Take 10 mg by mouth nightly as needed for Sleep. 10 mg  
    
   
   
   
  
 * Notice: This list has 2 medication(s) that are the same as other medications prescribed for you. Read the directions carefully, and ask your doctor or other care provider to review them with you. Discharge Instructions DISCHARGE SUMMARY from Nurse PATIENT INSTRUCTIONS: 
 
 
F-face looks uneven A-arms unable to move or move unevenly S-speech slurred or non-existent T-time-call 911 as soon as signs and symptoms begin-DO NOT go Back to bed or wait to see if you get better-TIME IS BRAIN. Warning Signs of HEART ATTACK Call 911 if you have these symptoms: 
? Chest discomfort. Most heart attacks involve discomfort in the center of the chest that lasts more than a few minutes, or that goes away and comes back. It can feel like uncomfortable pressure, squeezing, fullness, or pain. ? Discomfort in other areas of the upper body. Symptoms can include pain or discomfort in one or both arms, the back, neck, jaw, or stomach. ? Shortness of breath with or without chest discomfort. ? Other signs may include breaking out in a cold sweat, nausea, or lightheadedness. Don't wait more than five minutes to call [address]! Fast action can save your life. Calling 911 is almost always the fastest way to get lifesaving treatment. Emergency Medical Services staff can begin treatment when they arrive  up to an hour sooner than if someone gets to the hospital by car. The discharge information has been reviewed with the patient and spouse. The patient and spouse verbalized understanding. Discharge medications reviewed with the patient and spouse and appropriate educational materials and side effects teaching were provided. ___________________________________________________________________________________________________________________________________ Unresulted Labs-Please follow up with your PCP about these lab tests Order Current Status MRI CERV SPINE WO CONT In process Providers Seen During Your Hospitalization Provider Specialty Primary office phone Aniyah Solis MD Radiology [phone] Your Primary Care Physician (PCP) Primary Care Physician Office Phone Office Fax Marion De La Torre [phone] You are allergic to the following Allergen Reactions Bactrim (Sulfamethoprim Ds) Rash Codeine Other (comments) Upset stomach  
    
 Sulfa (Sulfonamide Antibiotics) Other (comments) Blisters brusing platelets down Vicodin (Hydrocodone-Acetaminophen) Other (comments)  
 hallucinations Recent Documentation Height Weight BMI OB Status Smoking Status 1.626 m 107.5 kg 40.68 kg/m2 Hysterectomy Former Smoker Emergency Contacts Name Discharge Info Relation Home Work Mobile Huan Dye DISCHARGE CAREGIVER [3] Other Relative [6] [phone] Patient Belongings The following personal items are in your possession at time of discharge: 
  Dental Appliances: None         Home Medications: None   Jewelry: None  Clothing: Undergarments, Jacket/Coat, Socks, Footwear, Shirt, Pants    Other Valuables: Cell Phone Please provide this summary of care documentation to your next provider. Signatures-by signing, you are acknowledging that this After Visit Summary has been reviewed with you and you have received a copy. Patient Signature:  ____________________________________________________________ Date:  ____________________________________________________________  
  
Cindi Holliday Provider Signature:  ____________________________________________________________ Date:  ____________________________________________________________

## 2018-02-14 NOTE — PERIOP NOTES
I have reviewed discharge instructions with the patient and spouse. The patient and spouse verbalized understanding.     Patient armband removed and shredded

## 2018-02-14 NOTE — ANESTHESIA PREPROCEDURE EVALUATION
Anesthetic History               Review of Systems / Medical History  Patient summary reviewed, nursing notes reviewed and pertinent labs reviewed    Pulmonary  Within defined limits                 Neuro/Psych   Within defined limits           Cardiovascular  Within defined limits  Hypertension: poorly controlled              Exercise tolerance: >4 METS     GI/Hepatic/Renal     GERD: well controlled           Endo/Other    Diabetes: poorly controlled, type 2, using insulin    Morbid obesity     Other Findings   Comments: Documentation of current medication  Current medications obtained, documented and obtained? YES      Risk Factors for Postoperative nausea/vomiting:       History of postoperative nausea/vomiting? NO       Female? YES       Motion sickness? NO       Intended opioid administration for postoperative analgesia? NO      Smoking Abstinence:  Current Smoker? NO  Elective Surgery? YES  Seen preoperatively by anesthesiologist or proxy prior to day of surgery? YES  Pt abstained from smoking 24 hours prior to anesthesia?  N/A    Preventive care/screening for High Blood Pressure:  Aged 18 years and older: YES  Screened for high blood pressure: YES  Patients with high blood pressure referred to primary care provider   for BP management: YES                 Physical Exam    Airway  Mallampati: III  TM Distance: 4 - 6 cm  Neck ROM: normal range of motion   Mouth opening: Normal     Cardiovascular  Regular rate and rhythm,  S1 and S2 normal,  no murmur, click, rub, or gallop  Rhythm: regular  Rate: normal         Dental  No notable dental hx       Pulmonary  Breath sounds clear to auscultation               Abdominal  GI exam deferred       Other Findings            Anesthetic Plan    ASA: 3  Anesthesia type: MAC          Induction: Intravenous  Anesthetic plan and risks discussed with: Patient

## 2018-02-14 NOTE — ANESTHESIA POSTPROCEDURE EVALUATION
Post-Anesthesia Evaluation and Assessment    Patient: Barney Conn MRN: [id number]  SSN: [id number]    YOB: 1960  Age: 62 y.o. Sex: female       Cardiovascular Function/Vital Signs  Visit Vitals    /80    Pulse 88    Temp 36.1 °C (97 °F)    Resp 15    Ht 5' 4\" (1.626 m)    Wt 107.5 kg (237 lb)    SpO2 100%    BMI 40.68 kg/m2       Patient is status post MAC anesthesia for * No procedures listed *. Nausea/Vomiting: None    Postoperative hydration reviewed and adequate. Pain:  Pain Scale 1: Numeric (0 - 10) (02/14/18 1506)  Pain Intensity 1: 0 (02/14/18 1506)   Managed    Neurological Status:   Neuro (WDL): Within Defined Limits (02/14/18 1451)   At baseline    Mental Status and Level of Consciousness: Arousable    Pulmonary Status:   O2 Device: Room air (02/14/18 1508)   Adequate oxygenation and airway patent    Complications related to anesthesia: None    Post-anesthesia assessment completed.  No concerns    Signed By: Henri Hernandez MD     February 14, 2018

## 2018-02-14 NOTE — DISCHARGE INSTRUCTIONS
DISCHARGE SUMMARY from Nurse    PATIENT INSTRUCTIONS:    After general anesthesia or intravenous sedation, for 24 hours or while taking prescription Narcotics:  · Limit your activities  · Do not drive and operate hazardous machinery  · Do not make important personal or business decisions  · Do  not drink alcoholic beverages  · If you have not urinated within 8 hours after discharge, please contact your surgeon on call. Report the following to your surgeon:  · Excessive pain, swelling, redness or odor of or around the surgical area  · Temperature over 100.5  · Nausea and vomiting lasting longer than 4 hours or if unable to take medications  · Any signs of decreased circulation or nerve impairment to extremity: change in color, persistent  numbness, tingling, coldness or increase pain  · Any questions    *  Please give a list of your current medications to your Primary Care Provider. *  Please update this list whenever your medications are discontinued, doses are      changed, or new medications (including over-the-counter products) are added. *  Please carry medication information at all times in case of emergency situations. These are general instructions for a healthy lifestyle:    No smoking/ No tobacco products/ Avoid exposure to second hand smoke  Surgeon General's Warning:  Quitting smoking now greatly reduces serious risk to your health. Obesity, smoking, and sedentary lifestyle greatly increases your risk for illness    A healthy diet, regular physical exercise & weight monitoring are important for maintaining a healthy lifestyle    You may be retaining fluid if you have a history of heart failure or if you experience any of the following symptoms:  Weight gain of 3 pounds or more overnight or 5 pounds in a week, increased swelling in our hands or feet or shortness of breath while lying flat in bed.   Please call your doctor as soon as you notice any of these symptoms; do not wait until your next office visit. Recognize signs and symptoms of STROKE:    F-face looks uneven    A-arms unable to move or move unevenly    S-speech slurred or non-existent    T-time-call 911 as soon as signs and symptoms begin-DO NOT go       Back to bed or wait to see if you get better-TIME IS BRAIN. Warning Signs of HEART ATTACK     Call 911 if you have these symptoms:   Chest discomfort. Most heart attacks involve discomfort in the center of the chest that lasts more than a few minutes, or that goes away and comes back. It can feel like uncomfortable pressure, squeezing, fullness, or pain.  Discomfort in other areas of the upper body. Symptoms can include pain or discomfort in one or both arms, the back, neck, jaw, or stomach.  Shortness of breath with or without chest discomfort.  Other signs may include breaking out in a cold sweat, nausea, or lightheadedness. Don't wait more than five minutes to call 911 - MINUTES MATTER! Fast action can save your life. Calling 911 is almost always the fastest way to get lifesaving treatment. Emergency Medical Services staff can begin treatment when they arrive -- up to an hour sooner than if someone gets to the hospital by car. The discharge information has been reviewed with the patient and spouse. The patient and spouse verbalized understanding. Discharge medications reviewed with the patient and spouse and appropriate educational materials and side effects teaching were provided.   ___________________________________________________________________________________________________________________________________

## 2018-03-08 ENCOUNTER — OFFICE VISIT (OUTPATIENT)
Dept: ORTHOPEDIC SURGERY | Age: 58
End: 2018-03-08

## 2018-03-08 VITALS
RESPIRATION RATE: 18 BRPM | WEIGHT: 237 LBS | DIASTOLIC BLOOD PRESSURE: 82 MMHG | BODY MASS INDEX: 40.46 KG/M2 | SYSTOLIC BLOOD PRESSURE: 144 MMHG | HEART RATE: 97 BPM | HEIGHT: 64 IN

## 2018-03-08 DIAGNOSIS — M25.511 CHRONIC RIGHT SHOULDER PAIN: ICD-10-CM

## 2018-03-08 DIAGNOSIS — G89.29 CHRONIC RIGHT SHOULDER PAIN: ICD-10-CM

## 2018-03-08 DIAGNOSIS — M79.18 MYOFASCIAL PAIN: ICD-10-CM

## 2018-03-08 DIAGNOSIS — M96.1 POSTLAMINECTOMY SYNDROME, CERVICAL REGION: Chronic | ICD-10-CM

## 2018-03-08 DIAGNOSIS — M62.838 MUSCLE SPASM: ICD-10-CM

## 2018-03-08 DIAGNOSIS — M48.02 FORAMINAL STENOSIS OF CERVICAL REGION: Primary | ICD-10-CM

## 2018-03-08 PROBLEM — E66.01 OBESITY, MORBID (HCC): Status: ACTIVE | Noted: 2018-03-08

## 2018-03-08 RX ORDER — CYCLOBENZAPRINE HCL 10 MG
10 TABLET ORAL
Qty: 30 TAB | Refills: 1 | Status: SHIPPED | OUTPATIENT
Start: 2018-03-08

## 2018-03-08 RX ORDER — DICLOFENAC SODIUM 10 MG/G
GEL TOPICAL 4 TIMES DAILY
Qty: 5 EACH | Refills: 1 | Status: SHIPPED | OUTPATIENT
Start: 2018-03-08 | End: 2018-03-08 | Stop reason: SDUPTHER

## 2018-03-08 RX ORDER — DICLOFENAC SODIUM 10 MG/G
4 GEL TOPICAL 4 TIMES DAILY
Qty: 5 EACH | Refills: 1 | OUTPATIENT
Start: 2018-03-08

## 2018-03-08 NOTE — PATIENT INSTRUCTIONS
Neck Arthritis: Exercises  Your Care Instructions  Here are some examples of typical rehabilitation exercises for your condition. Start each exercise slowly. Ease off the exercise if you start to have pain. Your doctor or physical therapist will tell you when you can start these exercises and which ones will work best for you. How to do the exercises  Neck stretches to the side    1. This stretch works best if you keep your shoulder down as you lean away from it. To help you remember to do this, start by relaxing your shoulders and lightly holding on to your thighs or your chair. 2. Tilt your head toward your shoulder and hold for 15 to 30 seconds. Let the weight of your head stretch your muscles. 3. Repeat 2 to 4 times toward each shoulder. Chin tuck    1. Lie on the floor with a rolled-up towel under your neck. Your head should be touching the floor. 2. Slowly bring your chin toward your chest.  3. Hold for a count of 6, and then relax for up to 10 seconds. 4. Repeat 8 to 12 times. Active cervical rotation    1. Sit in a firm chair, or stand up straight. 2. Keeping your chin level, turn your head to the right, and hold for 15 to 30 seconds. 3. Turn your head to the left and hold for 15 to 30 seconds. 4. Repeat 2 to 4 times to each side. Shoulder blade squeeze    1. While standing, squeeze your shoulder blades together. 2. Do not raise your shoulders up as you are squeezing. 3. Hold for 6 seconds. 4. Repeat 8 to 12 times. Shoulder rolls    1. Sit comfortably with your feet shoulder-width apart. You can also do this exercise standing up. 2. Roll your shoulders up, then back, and then down in a smooth, circular motion. 3. Repeat 2 to 4 times. Follow-up care is a key part of your treatment and safety. Be sure to make and go to all appointments, and call your doctor if you are having problems. It's also a good idea to know your test results and keep a list of the medicines you take.   Where can you learn more? Go to http://nancy-warren.info/. Enter M045 in the search box to learn more about \"Neck Arthritis: Exercises. \"  Current as of: March 21, 2017  Content Version: 11.4  © 2916-2391 [employer]. Care instructions adapted under license by [employer] (which disclaims liability or warranty for this information). If you have questions about a medical condition or this instruction, always ask your healthcare professional. [address] any warranty or liability for your use of this information. Shoulder Arthritis: Exercises  Your Care Instructions  Here are some examples of typical rehabilitation exercises for your condition. Start each exercise slowly. Ease off the exercise if you start to have pain. Your doctor or physical therapist will tell you when you can start these exercises and which ones will work best for you. How to do the exercises  Shoulder flexion (lying down)    To make a wand for this exercise, use a piece of PVC pipe or a broom handle with the broom removed. Make the wand about a foot wider than your shoulders. 4. Lie on your back, holding a wand with both hands. Your palms should face down as you hold the wand. 5. Keeping your elbows straight, slowly raise your arms over your head. Raise them until you feel a stretch in your shoulders, upper back, and chest.  6. Hold for 15 to 30 seconds. 7. Repeat 2 to 4 times. Shoulder rotation (lying down)    To make a wand for this exercise, use a piece of PVC pipe or a broom handle with the broom removed. Make the wand about a foot wider than your shoulders. 5. Lie on your back. Hold a wand with both hands with your elbows bent and palms up. 6. Keep your elbows close to your body, and move the wand across your body toward the sore arm. 7. Hold for 8 to 12 seconds. 8. Repeat 2 to 4 times. Shoulder internal rotation with towel    5.  Hold a towel above and behind your head with the arm that is not sore. 6. With your sore arm, reach behind your back and grasp the towel. 7. With the arm above your head, pull the towel upward. Do this until you feel a stretch on the front and outside of your sore shoulder. 8. Hold 15 to 30 seconds. 9. Repeat 2 to 4 times. Shoulder blade squeeze    5. Stand with your arms at your sides, and squeeze your shoulder blades together. Do not raise your shoulders up as you squeeze. 6. Hold 6 seconds. 7. Repeat 8 to 12 times. Resisted rows    For this exercise, you will need elastic exercise material, such as surgical tubing or Thera-Band. 4. Put the band around a solid object at about waist level. (A bedpost will work well.) Each hand should hold an end of the band. 5. With your elbows at your sides and bent to 90 degrees, pull the band back. Your shoulder blades should move toward each other. Return to the starting position. 6. Repeat 8 to 12 times. External rotator strengthening exercise    1. Start by tying a piece of elastic exercise material to a doorknob. You can use surgical tubing or Thera-Band. (You may also hold one end of the band in each hand.)  2. Stand or sit with your shoulder relaxed and your elbow bent 90 degrees. Your upper arm should rest comfortably against your side. Squeeze a rolled towel between your elbow and your body for comfort. This will help keep your arm at your side. 3. Hold one end of the elastic band with the hand of the painful arm. 4. Start with your forearm across your belly. Slowly rotate the forearm out away from your body. Keep your elbow and upper arm tucked against the towel roll or the side of your body until you begin to feel tightness in your shoulder. Slowly move your arm back to where you started. 5. Repeat 8 to 12 times. Internal rotator strengthening exercise    1. Start by tying a piece of elastic exercise material to a doorknob. You can use surgical tubing or Thera-Band.   2. Stand or sit with your shoulder relaxed and your elbow bent 90 degrees. Your upper arm should rest comfortably against your side. Squeeze a rolled towel between your elbow and your body for comfort. This will help keep your arm at your side. 3. Hold one end of the elastic band in the hand of the painful arm. 4. Slowly rotate your forearm toward your body until it touches your belly. Slowly move it back to where you started. 5. Keep your elbow and upper arm firmly tucked against the towel roll or at your side. 6. Repeat 8 to 12 times. Pendulum swing    If you have pain in your back, do not do this exercise. 1. Hold on to a table or the back of a chair with your good arm. Then bend forward a little and let your sore arm hang straight down. This exercise does not use the arm muscles. Rather, use your legs and your hips to create movement that makes your arm swing freely. 2. Use the movement from your hips and legs to guide the slightly swinging arm back and forth like a pendulum (or elephant trunk). Then guide it in circles that start small (about the size of a dinner plate). Make the circles a bit larger each day, as your pain allows. 3. Do this exercise for 5 minutes, 5 to 7 times each day. 4. As you have less pain, try bending over a little farther to do this exercise. This will increase the amount of movement at your shoulder. Follow-up care is a key part of your treatment and safety. Be sure to make and go to all appointments, and call your doctor if you are having problems. It's also a good idea to know your test results and keep a list of the medicines you take. Where can you learn more? Go to http://nancy-warren.info/. Enter H562 in the search box to learn more about \"Shoulder Arthritis: Exercises. \"  Current as of: March 21, 2017  Content Version: 11.4  © 7344-4563 [employer].  Care instructions adapted under license by [employer] (which disclaims liability or warranty for this information). If you have questions about a medical condition or this instruction, always ask your healthcare professional. [place] [address] any warranty or liability for your use of this information.

## 2018-03-08 NOTE — MR AVS SNAPSHOT
[address] [facility] 
 
 
 [address] [facility] 
[phone] Patient: Kit Talavera MRN: [id number] Visit Information Date & Time Provider Department Dept. Phone Encounter #  
 3/8/2018 11:30 AM Jamar López, 27 [facility] [place] [phone] [id number] Follow-up Instructions Return in about 6 weeks (around 4/19/2018) for PT follow up, Medication follow up. Upcoming Health Maintenance Date Due Hepatitis C Screening 1960 DTaP/Tdap/Td series (1 - Tdap) 11/25/1981 PAP AKA CERVICAL CYTOLOGY 11/25/1981 BREAST CANCER SCRN MAMMOGRAM 11/25/2010 FOBT Q 1 YEAR AGE 50-75 11/25/2010 Influenza Age 5 to Adult 8/1/2017 Allergies as of 3/8/2018  Review Complete On: 3/8/2018 By: Jamar López MD  
  
 Severity Noted Reaction Type Reactions Bactrim [Sulfamethoprim Ds]  08/21/2016    Rash Codeine  03/28/2011   Side Effect Other (comments) Upset stomach  
 Sulfa (Sulfonamide Antibiotics)  11/21/2017    Other (comments) Blisters brusing platelets down Vicodin [Hydrocodone-acetaminophen]  08/22/2011    Other (comments)  
 hallucinations Current Immunizations  Never Reviewed No immunizations on file. Not reviewed this visit You Were Diagnosed With   
  
 Codes Comments Foraminal stenosis of cervical region    -  Primary ICD-10-CM: M99.81 ICD-9-CM: 723.0 Muscle spasm     ICD-10-CM: O18.065 ICD-9-CM: 728.85 Myofascial pain     ICD-10-CM: M79.1 ICD-9-CM: 729.1 Postlaminectomy syndrome, cervical region     ICD-10-CM: M96.1 ICD-9-CM: 722.81 Chronic right shoulder pain     ICD-10-CM: M25.511, G89.29 ICD-9-CM: 719.41, 338.29 Vitals BP Pulse Resp Height(growth percentile) Weight(growth percentile) BMI  
 144/82 97 18 5' 4\" (1.626 m) 237 lb (107.5 kg) 40.68 kg/m2 OB Status Smoking Status Hysterectomy Former Smoker Vitals History BMI and BSA Data Body Mass Index Body Surface Area  
 40.68 kg/m 2 2.2 m 2 Preferred Pharmacy Pharmacy Name Phone [facility], [address] [place] [phone] Your Updated Medication List  
  
   
This list is accurate as of 3/8/18 12:18 PM.  Always use your most recent med list.  
  
  
  
  
 atorvastatin 40 mg tablet Commonly known as:  LIPITOR Take 40 mg by mouth daily. butalbital-acetaminophen-caffeine -40 mg per tablet Commonly known as:  [employer] Take 1-2 tablets by mouth every 4 hours as needed for headache. Maximum dose 6 capsules daily. cyclobenzaprine 10 mg tablet Commonly known as:  FLEXERIL Take 1 Tab by mouth nightly. Indications: Muscle Spasm  
  
 diclofenac 1 % Gel Commonly known as:  VOLTAREN Apply  to affected area four (4) times daily. * DULoxetine 60 mg capsule Commonly known as:  CYMBALTA Take 1 Cap by mouth daily. * DULoxetine 30 mg capsule Commonly known as:  CYMBALTA Take 1 Cap by mouth daily. gabapentin 300 mg capsule Commonly known as:  NEURONTIN  
TAKE TWO CAPSULES BY MOUTH THREE TIMES DAILY  
  
 LANTUS SOLOSTAR U-100 INSULIN 100 unit/mL (3 mL) Inpn Generic drug:  insulin glargine 25 Units by SubCUTAneous route nightly. metaxalone 800 mg tablet Commonly known as:  SKELAXIN Take 1 Tab by mouth three (3) times daily. Indications: Muscle Spasm  
  
 metFORMIN 1,000 mg tablet Commonly known as:  GLUCOPHAGE Take 1,000 mg by mouth two (2) times daily (with meals). NovoLOG Flexpen U-100 Insulin 100 unit/mL Inpn Generic drug:  insulin aspart U-100  
100 Units by SubCUTAneous route Before breakfast, lunch, and dinner. ondansetron 4 mg disintegrating tablet Commonly known as:  ZOFRAN ODT Take 1-2 tablets every 6-8 hours as needed for nausea and vomiting. valsartan-hydroCHLOROthiazide 160-12.5 mg per tablet Commonly known as:  DIOVAN-HCT Take 1 Tab by mouth daily. zolpidem 10 mg tablet Commonly known as:  AMBIEN Take 10 mg by mouth nightly as needed for Sleep. * Notice: This list has 2 medication(s) that are the same as other medications prescribed for you. Read the directions carefully, and ask your doctor or other care provider to review them with you. Prescriptions Sent to Pharmacy Refills  
 cyclobenzaprine (FLEXERIL) 10 mg tablet 1 Sig: Take 1 Tab by mouth nightly. Indications: Muscle Spasm Class: Normal  
 Pharmacy: [facility] DR MELIZA MARINELLI [facility], [address] Ph #: [phone] Route: Oral  
 diclofenac (VOLTAREN) 1 % gel 1 Sig: Apply  to affected area four (4) times daily. Class: Normal  
 Pharmacy: [facility] [address] Ph #: [phone] Route: Topical  
  
Follow-up Instructions Return in about 6 weeks (around 4/19/2018) for PT follow up, Medication follow up. Patient Instructions Neck Arthritis: Exercises Your Care Instructions Here are some examples of typical rehabilitation exercises for your condition. Start each exercise slowly. Ease off the exercise if you start to have pain. Your doctor or physical therapist will tell you when you can start these exercises and which ones will work best for you. How to do the exercises Neck stretches to the side 1. This stretch works best if you keep your shoulder down as you lean away from it. To help you remember to do this, start by relaxing your shoulders and lightly holding on to your thighs or your chair. 2. Tilt your head toward your shoulder and hold for 15 to 30 seconds. Let the weight of your head stretch your muscles. 3. Repeat 2 to 4 times toward each shoulder. Chin tuck 1. Lie on the floor with a rolled-up towel under your neck.  Your head should be touching the floor. 2. Slowly bring your chin toward your chest. 
3. Hold for a count of 6, and then relax for up to 10 seconds. 4. Repeat 8 to 12 times. Active cervical rotation 1. Sit in a firm chair, or stand up straight. 2. Keeping your chin level, turn your head to the right, and hold for 15 to 30 seconds. 3. Turn your head to the left and hold for 15 to 30 seconds. 4. Repeat 2 to 4 times to each side. Shoulder blade squeeze 1. While standing, squeeze your shoulder blades together. 2. Do not raise your shoulders up as you are squeezing. 3. Hold for 6 seconds. 4. Repeat 8 to 12 times. Shoulder rolls 1. Sit comfortably with your feet shoulder-width apart. You can also do this exercise standing up. 2. Roll your shoulders up, then back, and then down in a smooth, circular motion. 3. Repeat 2 to 4 times. Follow-up care is a key part of your treatment and safety. Be sure to make and go to all appointments, and call your doctor if you are having problems. It's also a good idea to know your test results and keep a list of the medicines you take. Where can you learn more? Go to http://nancy-warren.info/. Enter I427 in the search box to learn more about \"Neck Arthritis: Exercises. \" Current as of: March 21, 2017 Content Version: 11.4 © 2477-2082 [employer]. Care instructions adapted under license by [employer] (which disclaims liability or warranty for this information). If you have questions about a medical condition or this instruction, always ask your healthcare professional. [address] any warranty or liability for your use of this information. Shoulder Arthritis: Exercises Your Care Instructions Here are some examples of typical rehabilitation exercises for your condition. Start each exercise slowly. Ease off the exercise if you start to have pain. Your doctor or physical therapist will tell you when you can start these exercises and which ones will work best for you. How to do the exercises Shoulder flexion (lying down) To make a wand for this exercise, use a piece of PVC pipe or a broom handle with the broom removed. Make the wand about a foot wider than your shoulders. 4. Lie on your back, holding a wand with both hands. Your palms should face down as you hold the wand. 5. Keeping your elbows straight, slowly raise your arms over your head. Raise them until you feel a stretch in your shoulders, upper back, and chest. 
6. Hold for 15 to 30 seconds. 7. Repeat 2 to 4 times. Shoulder rotation (lying down) To make a wand for this exercise, use a piece of PVC pipe or a broom handle with the broom removed. Make the wand about a foot wider than your shoulders. 5. Lie on your back. Hold a wand with both hands with your elbows bent and palms up. 6. Keep your elbows close to your body, and move the wand across your body toward the sore arm. 7. Hold for 8 to 12 seconds. 8. Repeat 2 to 4 times. Shoulder internal rotation with towel 5. Hold a towel above and behind your head with the arm that is not sore. 6. With your sore arm, reach behind your back and grasp the towel. 7. With the arm above your head, pull the towel upward. Do this until you feel a stretch on the front and outside of your sore shoulder. 8. Hold 15 to 30 seconds. 9. Repeat 2 to 4 times. Shoulder blade squeeze 5. Stand with your arms at your sides, and squeeze your shoulder blades together. Do not raise your shoulders up as you squeeze. 6. Hold 6 seconds. 7. Repeat 8 to 12 times. Resisted rows For this exercise, you will need elastic exercise material, such as surgical tubing or Thera-Band. 4. Put the band around a solid object at about waist level. (A bedpost will work well.) Each hand should hold an end of the band. 5. With your elbows at your sides and bent to 90 degrees, pull the band back. Your shoulder blades should move toward each other. Return to the starting position. 6. Repeat 8 to 12 times. External rotator strengthening exercise 1. Start by tying a piece of elastic exercise material to a doorknob. You can use surgical tubing or Thera-Band. (You may also hold one end of the band in each hand.) 2. Stand or sit with your shoulder relaxed and your elbow bent 90 degrees. Your upper arm should rest comfortably against your side. Squeeze a rolled towel between your elbow and your body for comfort. This will help keep your arm at your side. 3. Hold one end of the elastic band with the hand of the painful arm. 4. Start with your forearm across your belly. Slowly rotate the forearm out away from your body. Keep your elbow and upper arm tucked against the towel roll or the side of your body until you begin to feel tightness in your shoulder. Slowly move your arm back to where you started. 5. Repeat 8 to 12 times. Internal rotator strengthening exercise 1. Start by tying a piece of elastic exercise material to a doorknob. You can use surgical tubing or Thera-Band. 2. Stand or sit with your shoulder relaxed and your elbow bent 90 degrees. Your upper arm should rest comfortably against your side. Squeeze a rolled towel between your elbow and your body for comfort. This will help keep your arm at your side. 3. Hold one end of the elastic band in the hand of the painful arm. 4. Slowly rotate your forearm toward your body until it touches your belly. Slowly move it back to where you started. 5. Keep your elbow and upper arm firmly tucked against the towel roll or at your side. 6. Repeat 8 to 12 times. Pendulum swing If you have pain in your back, do not do this exercise. 1. Hold on to a table or the back of a chair with your good arm.  Then bend forward a little and let your sore arm hang straight down. This exercise does not use the arm muscles. Rather, use your legs and your hips to create movement that makes your arm swing freely. 2. Use the movement from your hips and legs to guide the slightly swinging arm back and forth like a pendulum (or elephant trunk). Then guide it in circles that start small (about the size of a dinner plate). Make the circles a bit larger each day, as your pain allows. 3. Do this exercise for 5 minutes, 5 to 7 times each day. 4. As you have less pain, try bending over a little farther to do this exercise. This will increase the amount of movement at your shoulder. Follow-up care is a key part of your treatment and safety. Be sure to make and go to all appointments, and call your doctor if you are having problems. It's also a good idea to know your test results and keep a list of the medicines you take. Where can you learn more? Go to http://nancy-warren.info/. Enter H562 in the search box to learn more about \"Shoulder Arthritis: Exercises. \" Current as of: March 21, 2017 Content Version: 11.4 © 0448-1951 [employer]. Care instructions adapted under license by [employer] (which disclaims liability or warranty for this information). If you have questions about a medical condition or this instruction, always ask your healthcare professional. [address] any warranty or liability for your use of this information. Please provide this summary of care documentation to your next provider. Your primary care clinician is listed as Kandace Forbes. If you have any questions after today's visit, please call [phone].

## 2018-03-08 NOTE — TELEPHONE ENCOUNTER
pharmacist from [facility] called in states that he received the refill for diclofenac (VOLTAREN) 1 % gel   But needs to know the gram amount per application. Please contact at [phone].

## 2018-03-08 NOTE — PROGRESS NOTES
[facility]  [address]antha [address]idad[address], [facility] [address]  Phone: [phone]  Fax: [phone]    Pt's YOB: 1960    ASSESSMENT   Diagnoses and all orders for this visit:    1. Foraminal stenosis of cervical region    2. Muscle spasm  -     cyclobenzaprine (FLEXERIL) 10 mg tablet; Take 1 Tab by mouth nightly. Indications: Muscle Spasm    3. Myofascial pain    4. Postlaminectomy syndrome, cervical region    5. Chronic right shoulder pain       IMPRESSION AND PLAN:  Sayda Kendrick is a 62 y.o. right hand dominant female with history of cervical and right shoulder pain. She has experienced pain since 2003 that is worse the longer she stands and sits. Pt tried cervical physical therapy but reports that she did not try dry needling. 1) Pt was given information on cervical and shoulder arthritis exercises. 2) She was offered a referral to shoulder physical therapy but deferred. Pt wishes to first try home exercises. 3) Discussed consultation regarding a breast reduction. 4) I recommended the patient try wearing a supportive bra with thicker straps. 5) She was prescribed Voltaren 1% gel. 6) Pt was also prescribed Flexeril 10 mg 1/2-1 tab QHS prn muscle spasm. 7) Ms. Sanam Paulson has a reminder for a \"due or due soon\" health maintenance. I have asked that she contact her primary care provider, Juliet Spangler MD, for follow-up on this health maintenance. 8)  demonstrated consistency with prescribing. 9) Pt will follow-up in 6 weeks or sooner if needed. HISTORY OF PRESENT ILLNESS:  Sayda Kendrick is a 62 y.o. right hand dominant female with history of cervical and right shoulder pain. Pt presents to the office today for cervical MRI follow up. She reports that she has experienced pain since 2003. Her neck and shoulder pain are worse the longer she stands and sits.  Pt tried cervical physical therapy but reports that she did not try dry needling. Of note, she wears a 42 D sized bra. Pt notes that she had a prior right rotator cuff repair. She used Voltaren 1% gel years ago but is unable to recall if it was effective. Pt has tried Skelaxin and Cymbalta with minimal relief. She takes Ambien as needed. Pt at this time desires to proceed with medication evaluation. Pain Scale: 9/10    PCP: Felicia Thompson MD     Past Medical History:   Diagnosis Date    Brachial neuritis or radiculitis NOS 3/28/2011    Carpal tunnel syndrome on right     Cervicalgia 3/28/2011    Diabetes ([facility] [address]     GERD (gastroesophageal reflux disease)     Hypertension     Neuralgia, neuritis, and radiculitis, unspecified 3/28/2011    Osteoarthritis     Pain in joint, shoulder region 3/28/2011    torn rotator cuff    Postlaminectomy syndrome, cervical region 3/28/2011        Social History     Social History    Marital status:      Spouse name: N/A    Number of children: N/A    Years of education: N/A     Occupational History    Not on file. Social History Main Topics    Smoking status: Former Smoker     Quit date: 3/28/1991    Smokeless tobacco: Never Used    Alcohol use 2.0 oz/week     4 Cans of beer per week      Comment: Occasionally    Drug use: No    Sexual activity: Not on file     Other Topics Concern    Not on file     Social History Narrative       Current Outpatient Prescriptions   Medication Sig Dispense Refill    cyclobenzaprine (FLEXERIL) 10 mg tablet Take 1 Tab by mouth nightly. Indications: Muscle Spasm 30 Tab 1    gabapentin (NEURONTIN) 300 mg capsule TAKE TWO CAPSULES BY MOUTH THREE TIMES DAILY 180 Cap 0    metaxalone (SKELAXIN) 800 mg tablet Take 1 Tab by mouth three (3) times daily. Indications: Muscle Spasm 60 Tab 0    zolpidem (AMBIEN) 10 mg tablet Take 10 mg by mouth nightly as needed for Sleep.       insulin aspart (NOVOLOG FLEXPEN) 100 unit/mL inpn 100 Units by SubCUTAneous route Before breakfast, lunch, and dinner.  insulin glargine (LANTUS SOLOSTAR) 100 unit/mL (3 mL) inpn 25 Units by SubCUTAneous route nightly.  atorvastatin (LIPITOR) 40 mg tablet Take 40 mg by mouth daily.  valsartan-hydroCHLOROthiazide (DIOVAN-HCT) 160-12.5 mg per tablet Take 1 Tab by mouth daily.  ondansetron (ZOFRAN ODT) 4 mg disintegrating tablet Take 1-2 tablets every 6-8 hours as needed for nausea and vomiting. 10 Tab 0    metFORMIN (GLUCOPHAGE) 1,000 mg tablet Take 1,000 mg by mouth two (2) times daily (with meals).  diclofenac (VOLTAREN) 1 % gel Apply 4 g to affected area four (4) times daily. 5 Each 1    DULoxetine (CYMBALTA) 30 mg capsule Take 1 Cap by mouth daily. 7 Cap 0    DULoxetine (CYMBALTA) 60 mg capsule Take 1 Cap by mouth daily. 30 Cap 1    butalbital-acetaminophen-caffeine (FIORICET) -40 mg per tablet Take 1-2 tablets by mouth every 4 hours as needed for headache. Maximum dose 6 capsules daily. 12 Tab 0       Allergies   Allergen Reactions    Bactrim [Sulfamethoprim Ds] Rash    Codeine Other (comments)     Upset stomach    Sulfa (Sulfonamide Antibiotics) Other (comments)     Blisters brusing platelets down    Vicodin [Hydrocodone-Acetaminophen] Other (comments)     hallucinations         REVIEW OF SYSTEMS    Constitutional: Negative for fever, chills, or weight change. Respiratory: Negative for cough or shortness of breath. Cardiovascular: Negative for chest pain or palpitations. Gastrointestinal: Negative for acid reflux, change in bowel habits, or constipation. Genitourinary: Negative for dysuria and flank pain. Musculoskeletal: Positive for cervical pain. Skin: Negative for rash. Neurological: Negative for headaches, dizziness, or numbness. Endo/Heme/Allergies: Negative for increased bruising. Psychiatric/Behavioral: Positive for difficulty with sleep.       PHYSICAL EXAMINATION  Visit Vitals    /82    Pulse 97    Resp 18    Ht 5' 4\" (1.626 m)    Wt 237 lb (107.5 kg)    BMI 40.68 kg/m2       Constitutional: Awake, alert, and in no acute distress. Neurological: 1+ symmetrical DTRs in the upper extremities. Sensation to light touch is intact. Negative Cecilio's sign bilaterally. Skin: warm, dry, and intact. Musculoskeletal: Very tight across the upper trapezii with scattered trigger points. Pain with right shoulder abduction. Positive Keith' and Neer's test on the right. Good range of motion of the left shoulder. Mild give with the empty can test on the right but overall good strength. Good strength with resisted abduction and adduction. Biceps  Triceps Deltoids Wrist Ext Wrist Flex Hand Intrin   Right +4/5  4/5 +4/5 +4/5 +4/5 +4/5   Left +4/5 +4/5 +4/5 +4/5 +4/5 +4/5     IMAGING:    Cervical spine MRI from 02/14/2018 was personally reviewed with the patient and demonstrated:    Results from [place] encounter on 02/14/18   MRI CERV SPINE WO CONT   Narrative PROCEDURE: MRI of the cervical spine without contrast.    CPT CODE: 27976    INDICATIONS:  CERVICAL POST-LAMINECTOMY SYNDROME/ NEURITIS/ RT ARM WEAKNESS. Previous cervical fusion in 2008 per history. COMPARISONS: 8/1/2008 cervical spine MRI. TECHNIQUE: T1 weighted, T2 FSE, FSE inversion recovery sagittal images are  supplemented by T2 weighted axial images. Patient was scanned with MAC sedation  due to claustrophobia. FINDINGS:  Post surgical changes of C5-C6 ACDF. Appears to be grossly solid bony fusion  across the disc space. Limited details of the fusion hardware with MRI, appears  grossly intact. Above and below the fused segments the vertebral body heights are maintained. No  findings of acute or chronic fracture. Straightening of the cervical spine, no subluxations. Some mild loss of disc height above the fused segments at C4/C5. Some mild  desiccative changes at C6/C7. No suspicious lesions. No abnormal marrow signal present.    Visualized prevertebral soft tissues are unremarkable. Atlanto-dens interval normal.    No Chiari I malformation. Cord morphology and signal intensity are unremarkable throughout. Correlation of axial and sagittal images reveals the following: At C2-C3: Small right central disc and osteophyte complex with indentation of  the right of midline central canal without central stenosis (axial T2 series 12  image 124). Some contiguous right uncovertebral spurring with mild right  foraminal stenosis. No left foraminal stenosis. At C3-C4: Tiny posterior disc and osteophyte complex but no central canal  stenosis. Mild right facet arthropathy and mild right foraminal stenosis (axial  T2 image 108). At C4-C5: Level above the fused segments. Minimal posterior endplate osteophytes  but no central canal stenosis. No left foraminal stenosis. Some mild right  uncovertebral spurring and mild right facet arthropathy. Mild to moderate right  foraminal narrowing (axial T2 85). At C5-C6: Postsurgical level. Solid bony fusion. No residual recurrent disc  pathology. No central or foraminal narrowing    At C6-C7: Broad-based posterior disc and osteophyte complex. Indentation of the  ventral CSF space, no central canal narrowing. Residual AP diameter of the  central canal is 11 mm. Mild bilateral facet arthropathy and mild uncovertebral  spurring with mild bilateral foraminal stenosis. At C7-T1: No significant disc pathology or proliferative changes. No central  canal or foraminal stenosis. Upper thoracic disc levels visible on this exam appear normal on sagittal only  images. .         Impression IMPRESSION:    1. Remote C5-C6 ACDF. Solid bony fusion, no residual or recurrent central canal  or foraminal stenosis. 2. Degenerative changes below the fused segment at C6/C7 level. Disc bulge  without central canal stenosis. Mild bilateral foraminal stenosis.     3. Right-sided foraminal narrowing at C2/3, C3/4, and C4/5 without definite  nerve root impingement. Written by Carlton Latif, as dictated by Kristian Lepe MD.  I, Dr. Kristian Lepe confirm that all documentation is accurate.

## 2018-03-08 NOTE — TELEPHONE ENCOUNTER
Per Dr. Lacie Cornell, Voltaren gel Apply 4 g QID to Right shoulder. Med corrected in system, spoke with Pharmacist Uzair Franlkin, informed of above. No further action required at this time.

## 2018-09-06 ENCOUNTER — OFFICE VISIT (OUTPATIENT)
Dept: ORTHOPEDIC SURGERY | Age: 58
End: 2018-09-06

## 2018-09-06 VITALS
DIASTOLIC BLOOD PRESSURE: 81 MMHG | WEIGHT: 245.4 LBS | SYSTOLIC BLOOD PRESSURE: 145 MMHG | RESPIRATION RATE: 16 BRPM | BODY MASS INDEX: 41.89 KG/M2 | HEART RATE: 98 BPM | OXYGEN SATURATION: 99 % | HEIGHT: 64 IN

## 2018-09-06 DIAGNOSIS — G89.29 CHRONIC RIGHT SHOULDER PAIN: ICD-10-CM

## 2018-09-06 DIAGNOSIS — M25.511 CHRONIC RIGHT SHOULDER PAIN: ICD-10-CM

## 2018-09-06 DIAGNOSIS — M62.838 MUSCLE SPASM: ICD-10-CM

## 2018-09-06 DIAGNOSIS — M96.1 POSTLAMINECTOMY SYNDROME, CERVICAL REGION: Primary | Chronic | ICD-10-CM

## 2018-09-06 DIAGNOSIS — M79.18 MYOFASCIAL PAIN: ICD-10-CM

## 2018-09-06 DIAGNOSIS — M47.812 CERVICAL FACET JOINT SYNDROME: ICD-10-CM

## 2018-09-06 RX ORDER — MELOXICAM 7.5 MG/1
TABLET ORAL
Qty: 60 TAB | Refills: 3 | Status: SHIPPED | OUTPATIENT
Start: 2018-09-06

## 2018-09-06 RX ORDER — CYCLOBENZAPRINE HCL 10 MG
TABLET ORAL
Qty: 30 TAB | Refills: 3 | Status: SHIPPED | OUTPATIENT
Start: 2018-09-06

## 2018-09-06 RX ORDER — KETOROLAC TROMETHAMINE 10 MG/1
TABLET, FILM COATED ORAL
COMMUNITY
Start: 2018-06-20

## 2018-09-06 RX ORDER — METHYLPREDNISOLONE 4 MG/1
TABLET ORAL
Qty: 1 DOSE PACK | Refills: 0 | Status: SHIPPED | OUTPATIENT
Start: 2018-09-06

## 2018-09-06 RX ORDER — NAPROXEN 500 MG/1
TABLET ORAL
COMMUNITY
Start: 2018-06-27

## 2018-09-06 RX ORDER — LOSARTAN POTASSIUM AND HYDROCHLOROTHIAZIDE 12.5; 5 MG/1; MG/1
TABLET ORAL
COMMUNITY
Start: 2018-08-09

## 2018-09-06 NOTE — PROGRESS NOTES
[facility]  [address]antha [address], [facility] [address]  Phone: [phone]  Fax: [phone]    Pt's YOB: 1960    ASSESSMENT   Diagnoses and all orders for this visit:    1. Postlaminectomy syndrome, cervical region  -     REFERRAL TO PHYSICAL THERAPY    2. Myofascial pain  -     REFERRAL TO PHYSICAL THERAPY    3. Cervical facet joint syndrome (HCC)  -     methylPREDNISolone (MEDROL DOSEPACK) 4 mg tablet; Per dose pack instructions  -     meloxicam (MOBIC) 7.5 mg tablet; 1 po bid as needed for pain -- take with food  -     REFERRAL TO PHYSICAL THERAPY    4. Chronic right shoulder pain  -     meloxicam (MOBIC) 7.5 mg tablet; 1 po bid as needed for pain -- take with food  -     REFERRAL TO PHYSICAL THERAPY    5. Muscle spasm  -     cyclobenzaprine (FLEXERIL) 10 mg tablet; 1/2 - 1 po q pm as needed for muscle spasm  Indications: Muscle Spasm  -     REFERRAL TO PHYSICAL THERAPY         IMPRESSION AND PLAN:  Mika Finn is a 62 y.o. right hand dominant female with history of cervical and right shoulder pain. Pt complains of progressive neck pain that starts in the occipital region and extends to the right shoulder. She denies any pain radiating down the right arm but admits to sharp \"peiricing\" pain in the right shoulder. Pt has tried physical therapy in the past but has not tried dry needling. 1) Pt was given information on cervical arthritis exercises. 2) She was referred to physical therapy with dry needling. 3) Pt was prescribed Mobic 7.5 mg 1 tab BID prn with food. 4) She was also prescribed Flexeril 10 mg 1/2-1 tab QHS prn muscle spasm. 5) Pt was also prescribed a Medrol Dosepak. 6) Ms. Donavan Gage has a reminder for a \"due or due soon\" health maintenance. I have asked that she contact her primary care provider, Luis Felipe Naranjo MD, for follow-up on this health maintenance. 7)  demonstrated consistency with prescribing.    8) Pt will follow-up in 10 weeks or sooner if needed. HISTORY OF PRESENT ILLNESS:  Devin Holliday is a 62 y.o. right hand dominant female with history of cervical and right shoulder pain. She was last seen on 12/14/2017 and presents to the office today for cervical MRI follow up. Pt complains of progressive neck and right shoulder pain and notes that her pain interferes with ADL's. She also complains of difficulty walking and playing with her grandchildren. Pt complains of pain in the occipital region that extends to the scapular region and into the right shoulder. She complains of pain when she gets her hair cut and with overhead motion. Pt experiences headaches daily, generally across the forehead. Pt denies any pain radiating down the right arm but admits to sharp \"piercing pain in the right shoulder. She lives in [place], [place] and notes her pain worsened with her commute this morning. Of note, she had prior right rotator cuff surgery in 2003 with Dr. Diamond Arredondo and a prior cervical surgery in 2008 with Dr. Carol Silva. Pt notes experiences difficulty finding a comfortable positions at night and notes that her pain interferes with sleep. Of note, she sleeps on 4 pillows and notes that she has tried sleeping on 0-4 pillows without improvement. She was referred to physical therapy with dry needling in the past but states that workman's compensation only approved physical therapy. Pt has used Voltaren 1% gel in the past with benefit. Of note, patient is diabetic and notes that her blood sugars are well controlled at this time. Pt states that her A1C has decreased from 11 to 8. She has tried Cymbalta and Neurontin in the past without relief. Pt admits that she slept better when taking Flexeril in the past. She has never tried Mobic and denies any history of renal issues. Pt at this time desires to proceed with cervical physical therapy with dry needling and medication evaluation.     Pain Scale: /10    PCP: Bib Tafoya MD N[place]     Past Medical History:   Diagnosis Date    Brachial neuritis or radiculitis NOS 3/28/2011    Carpal tunnel syndrome on right     Cervicalgia 3/28/2011    Diabetes (HCC)     GERD (gastroesophageal reflux disease)     Hypertension     Neuralgia, neuritis, and radiculitis, unspecified 3/28/2011    Osteoarthritis     Pain in joint, shoulder region 3/28/2011    torn rotator cuff    Postlaminectomy syndrome, cervical region 3/28/2011        Social History     Social History    Marital status:      Spouse name: N/A    Number of children: N/A    Years of education: N/A     Occupational History    Not on file. Social History Main Topics    Smoking status: Former Smoker     Quit date: 3/28/1991    Smokeless tobacco: Never Used    Alcohol use 2.0 oz/week     4 Cans of beer per week      Comment: Occasionally    Drug use: No    Sexual activity: Not on file     Other Topics Concern    Not on file     Social History Narrative       Current Outpatient Prescriptions   Medication Sig Dispense Refill    methylPREDNISolone (MEDROL DOSEPACK) 4 mg tablet Per dose pack instructions 1 Dose Pack 0    meloxicam (MOBIC) 7.5 mg tablet 1 po bid as needed for pain -- take with food 60 Tab 3    cyclobenzaprine (FLEXERIL) 10 mg tablet 1/2 - 1 po q pm as needed for muscle spasm  Indications: Muscle Spasm 30 Tab 3    losartan-hydroCHLOROthiazide (HYZAAR) 50-12.5 mg per tablet       cyclobenzaprine (FLEXERIL) 10 mg tablet Take 1 Tab by mouth nightly. Indications: Muscle Spasm 30 Tab 1    diclofenac (VOLTAREN) 1 % gel Apply 4 g to affected area four (4) times daily. 5 Each 1    gabapentin (NEURONTIN) 300 mg capsule TAKE TWO CAPSULES BY MOUTH THREE TIMES DAILY 180 Cap 0    zolpidem (AMBIEN) 10 mg tablet Take 10 mg by mouth nightly as needed for Sleep.  insulin aspart (NOVOLOG FLEXPEN) 100 unit/mL inpn 100 Units by SubCUTAneous route Before breakfast, lunch, and dinner.       insulin glargine (LANTUS SOLOSTAR) 100 unit/mL (3 mL) inpn 25 Units by SubCUTAneous route nightly.  atorvastatin (LIPITOR) 40 mg tablet Take 40 mg by mouth daily.  metFORMIN (GLUCOPHAGE) 1,000 mg tablet Take 1,000 mg by mouth two (2) times daily (with meals).  ketorolac (TORADOL) 10 mg tablet       naproxen (NAPROSYN) 500 mg tablet       DULoxetine (CYMBALTA) 30 mg capsule Take 1 Cap by mouth daily. 7 Cap 0    metaxalone (SKELAXIN) 800 mg tablet Take 1 Tab by mouth three (3) times daily. Indications: Muscle Spasm 60 Tab 0    valsartan-hydroCHLOROthiazide (DIOVAN-HCT) 160-12.5 mg per tablet Take 1 Tab by mouth daily.  DULoxetine (CYMBALTA) 60 mg capsule Take 1 Cap by mouth daily. 30 Cap 1    ondansetron (ZOFRAN ODT) 4 mg disintegrating tablet Take 1-2 tablets every 6-8 hours as needed for nausea and vomiting. 10 Tab 0    butalbital-acetaminophen-caffeine (FIORICET) -40 mg per tablet Take 1-2 tablets by mouth every 4 hours as needed for headache. Maximum dose 6 capsules daily. 12 Tab 0       Allergies   Allergen Reactions    Bactrim [Sulfamethoprim Ds] Rash    Codeine Other (comments)     Upset stomach    Sulfa (Sulfonamide Antibiotics) Other (comments)     Blisters brusing platelets down    Vicodin [Hydrocodone-Acetaminophen] Other (comments)     hallucinations         REVIEW OF SYSTEMS    Constitutional: Negative for fever, chills, or weight change. Respiratory: Negative for cough or shortness of breath. Cardiovascular: Negative for chest pain or palpitations. Gastrointestinal: Negative for acid reflux, change in bowel habits, or constipation. Genitourinary: Negative for dysuria and flank pain. Musculoskeletal: Positive for cervical and shoulder pain. Skin: Negative for rash. Neurological: Negative for headaches, dizziness, or numbness. Endo/Heme/Allergies: Negative for increased bruising. Psychiatric/Behavioral: Positive for difficulty with sleep.       PHYSICAL EXAMINATION  Visit Vitals    /81    Pulse 98    Resp 16    Ht 5' 4\" (1.626 m)    Wt 245 lb 6.4 oz (111.3 kg)    SpO2 99%    BMI 42.12 kg/m2       Constitutional: Awake, alert, and in no acute distress. Neurological: 1+ symmetrical DTRs in the upper extremities. 1+ symmetrical DTRs in the lower extremities. Sensation to light touch is intact. Negative Cecilio's sign bilaterally. Skin: warm, dry, and intact. Musculoskeletal: Severely decreased range of motion with right sided cervical flexion; minimally decreased range of motion with left cervical flexion. Very tight across the upper trapezii with scattered trigger points. Pain with abduction to 90 degrees of the right arm. Positive Keith' and Neer's test on the right. Positive sign of impingement of the right shoulder. Mild weakness with the empty can test on the right. Mild weakness with resisted abduction and adduction on the right. Biceps  Triceps Deltoids Wrist Ext Wrist Flex Hand Intrin   Right  4/5  4/5  4/5 +4/5 +4/5 +4/5   Left +4/5 +4/5 +4/5 +4/5 +4/5 +4/5       IMAGING:    Cervical spine MRI from 02/14/2018 was personally reviewed with the patient and demonstrated:    Results from [place] encounter on 02/14/18   MRI CERV SPINE WO CONT   Narrative PROCEDURE: MRI of the cervical spine without contrast.    CPT CODE: 87411    INDICATIONS:  CERVICAL POST-LAMINECTOMY SYNDROME/ NEURITIS/ RT ARM WEAKNESS. Previous cervical fusion in 2008 per history. COMPARISONS: 8/1/2008 cervical spine MRI. TECHNIQUE: T1 weighted, T2 FSE, FSE inversion recovery sagittal images are  supplemented by T2 weighted axial images. Patient was scanned with MAC sedation  due to claustrophobia. FINDINGS:  Post surgical changes of C5-C6 ACDF. Appears to be grossly solid bony fusion  across the disc space. Limited details of the fusion hardware with MRI, appears  grossly intact. Above and below the fused segments the vertebral body heights are maintained. No  findings of acute or chronic fracture. Straightening of the cervical spine, no subluxations. Some mild loss of disc height above the fused segments at C4/C5. Some mild  desiccative changes at C6/C7. No suspicious lesions. No abnormal marrow signal present. Visualized prevertebral soft tissues are unremarkable. Atlanto-dens interval normal.    No Chiari I malformation. Cord morphology and signal intensity are unremarkable throughout. Correlation of axial and sagittal images reveals the following: At C2-C3: Small right central disc and osteophyte complex with indentation of  the right of midline central canal without central stenosis (axial T2 series 12  image 124). Some contiguous right uncovertebral spurring with mild right  foraminal stenosis. No left foraminal stenosis. At C3-C4: Tiny posterior disc and osteophyte complex but no central canal  stenosis. Mild right facet arthropathy and mild right foraminal stenosis (axial  T2 image 108). At C4-C5: Level above the fused segments. Minimal posterior endplate osteophytes  but no central canal stenosis. No left foraminal stenosis. Some mild right  uncovertebral spurring and mild right facet arthropathy. Mild to moderate right  foraminal narrowing (axial T2 85). At C5-C6: Postsurgical level. Solid bony fusion. No residual recurrent disc  pathology. No central or foraminal narrowing    At C6-C7: Broad-based posterior disc and osteophyte complex. Indentation of the  ventral CSF space, no central canal narrowing. Residual AP diameter of the  central canal is 11 mm. Mild bilateral facet arthropathy and mild uncovertebral  spurring with mild bilateral foraminal stenosis. At C7-T1: No significant disc pathology or proliferative changes. No central  canal or foraminal stenosis. Upper thoracic disc levels visible on this exam appear normal on sagittal only  images. .         Impression IMPRESSION:    1. Remote C5-C6 ACDF.  Solid bony fusion, no residual or recurrent central canal  or foraminal stenosis. 2. Degenerative changes below the fused segment at C6/C7 level. Disc bulge  without central canal stenosis. Mild bilateral foraminal stenosis. 3. Right-sided foraminal narrowing at C2/3, C3/4, and C4/5 without definite  nerve root impingement. Written by Karl Ybarra, as dictated by Douglas Gustafson MD.  I, Dr. Douglas Gustafson confirm that all documentation is accurate.

## 2018-09-06 NOTE — PATIENT INSTRUCTIONS
Neck Arthritis: Exercises  Your Care Instructions  Here are some examples of typical rehabilitation exercises for your condition. Start each exercise slowly. Ease off the exercise if you start to have pain. Your doctor or physical therapist will tell you when you can start these exercises and which ones will work best for you. How to do the exercises  Neck stretches to the side    1. This stretch works best if you keep your shoulder down as you lean away from it. To help you remember to do this, start by relaxing your shoulders and lightly holding on to your thighs or your chair. 2. Tilt your head toward your shoulder and hold for 15 to 30 seconds. Let the weight of your head stretch your muscles. 3. Repeat 2 to 4 times toward each shoulder. Chin tuck    1. Lie on the floor with a rolled-up towel under your neck. Your head should be touching the floor. 2. Slowly bring your chin toward your chest.  3. Hold for a count of 6, and then relax for up to 10 seconds. 4. Repeat 8 to 12 times. Active cervical rotation    1. Sit in a firm chair, or stand up straight. 2. Keeping your chin level, turn your head to the right, and hold for 15 to 30 seconds. 3. Turn your head to the left and hold for 15 to 30 seconds. 4. Repeat 2 to 4 times to each side. Shoulder blade squeeze    1. While standing, squeeze your shoulder blades together. 2. Do not raise your shoulders up as you are squeezing. 3. Hold for 6 seconds. 4. Repeat 8 to 12 times. Shoulder rolls    1. Sit comfortably with your feet shoulder-width apart. You can also do this exercise standing up. 2. Roll your shoulders up, then back, and then down in a smooth, circular motion. 3. Repeat 2 to 4 times. Follow-up care is a key part of your treatment and safety. Be sure to make and go to all appointments, and call your doctor if you are having problems. It's also a good idea to know your test results and keep a list of the medicines you take.   Where can you learn more? Go to http://nancy-warren.info/. Enter U141 in the search box to learn more about \"Neck Arthritis: Exercises. \"  Current as of: November 29, 2017  Content Version: 11.7  © 3838-2392 [employer]. Care instructions adapted under license by [employer] (which disclaims liability or warranty for this information). If you have questions about a medical condition or this instruction, always ask your healthcare professional. [address] any warranty or liability for your use of this information.

## 2018-09-06 NOTE — MR AVS SNAPSHOT
[facility] 
 
 
 [address] [facility] 
[phone] Patient: Tamir Escobar MRN: [id number] SMP:[id number] Visit Information Date & Time Provider Department Dept. Phone Encounter #  
 9/6/2018 10:00 AM Melina Uriarte, 27 [facility] [place] [id number] Follow-up Instructions Return in about 2 months (around 11/6/2018) for PT follow up, Medication follow up. Upcoming Health Maintenance Date Due Hepatitis C Screening 1960 DTaP/Tdap/Td series (1 - Tdap) 11/25/1981 PAP AKA CERVICAL CYTOLOGY 11/25/1981 BREAST CANCER SCRN MAMMOGRAM 11/25/2010 FOBT Q 1 YEAR AGE 50-75 11/25/2010 Influenza Age 5 to Adult 8/1/2018 MEDICARE YEARLY EXAM 9/6/2018 Allergies as of 9/6/2018  Review Complete On: 9/6/2018 By: Valarie Pacheco LPN Severity Noted Reaction Type Reactions Bactrim [Sulfamethoprim Ds]  08/21/2016    Rash Codeine  03/28/2011   Side Effect Other (comments) Upset stomach  
 Sulfa (Sulfonamide Antibiotics)  11/21/2017    Other (comments) Blisters brusing platelets down Vicodin [Hydrocodone-acetaminophen]  08/22/2011    Other (comments)  
 hallucinations Current Immunizations  Never Reviewed No immunizations on file. Not reviewed this visit You Were Diagnosed With   
  
 Codes Comments Postlaminectomy syndrome, cervical region    -  Primary ICD-10-CM: M96.1 ICD-9-CM: 722.81 Myofascial pain     ICD-10-CM: M79.1 ICD-9-CM: 729.1 Chronic right shoulder pain     ICD-10-CM: M25.511, G89.29 ICD-9-CM: 719.41, 338.29 Cervical facet joint syndrome (HCC)     ICD-10-CM: M46.92 
ICD-9-CM: 723.8 Muscle spasm     ICD-10-CM: Y87.208 ICD-9-CM: 728.85 Vitals BP Pulse Resp Height(growth percentile) Weight(growth percentile) SpO2  
 145/81 98 16 5' 4\" (1.626 m) 245 lb 6.4 oz (111.3 kg) 99% BMI OB Status Smoking Status 42.12 kg/m2 Hysterectomy Former Smoker BMI and BSA Data Body Mass Index Body Surface Area  
 42.12 kg/m 2 2.24 m 2 Preferred Pharmacy Pharmacy Name Phone [facility], [address] [place] [phone] Your Updated Medication List  
  
   
This list is accurate as of 9/6/18 10:55 AM.  Always use your most recent med list.  
  
  
  
  
 atorvastatin 40 mg tablet Commonly known as:  LIPITOR Take 40 mg by mouth daily. butalbital-acetaminophen-caffeine -40 mg per tablet Commonly known as:  [employer] Take 1-2 tablets by mouth every 4 hours as needed for headache. Maximum dose 6 capsules daily. * cyclobenzaprine 10 mg tablet Commonly known as:  FLEXERIL Take 1 Tab by mouth nightly. Indications: Muscle Spasm * cyclobenzaprine 10 mg tablet Commonly known as:  FLEXERIL  
1/2 - 1 po q pm as needed for muscle spasm  Indications: Muscle Spasm  
  
 diclofenac 1 % Gel Commonly known as:  VOLTAREN Apply 4 g to affected area four (4) times daily. * DULoxetine 60 mg capsule Commonly known as:  CYMBALTA Take 1 Cap by mouth daily. * DULoxetine 30 mg capsule Commonly known as:  CYMBALTA Take 1 Cap by mouth daily. gabapentin 300 mg capsule Commonly known as:  NEURONTIN  
TAKE TWO CAPSULES BY MOUTH THREE TIMES DAILY  
  
 ketorolac 10 mg tablet Commonly known as:  TORADOL  
  
 LANTUS SOLOSTAR U-100 INSULIN 100 unit/mL (3 mL) Inpn Generic drug:  insulin glargine 25 Units by SubCUTAneous route nightly. losartan-hydroCHLOROthiazide 50-12.5 mg per tablet Commonly known as:  HYZAAR  
  
 meloxicam 7.5 mg tablet Commonly known as:  MOBIC  
1 po bid as needed for pain -- take with food  
  
 metaxalone 800 mg tablet Commonly known as:  SKELAXIN Take 1 Tab by mouth three (3) times daily. Indications: Muscle Spasm  
  
 metFORMIN 1,000 mg tablet Commonly known as:  GLUCOPHAGE  
 Take 1,000 mg by mouth two (2) times daily (with meals). methylPREDNISolone 4 mg tablet Commonly known as:  Adina Cambric Per dose pack instructions  
  
 naproxen 500 mg tablet Commonly known as:  NAPROSYN NovoLOG Flexpen U-100 Insulin 100 unit/mL Inpn Generic drug:  insulin aspart U-100  
100 Units by SubCUTAneous route Before breakfast, lunch, and dinner. ondansetron 4 mg disintegrating tablet Commonly known as:  ZOFRAN ODT Take 1-2 tablets every 6-8 hours as needed for nausea and vomiting.  
  
 valsartan-hydroCHLOROthiazide 160-12.5 mg per tablet Commonly known as:  DIOVAN-HCT Take 1 Tab by mouth daily. zolpidem 10 mg tablet Commonly known as:  AMBIEN Take 10 mg by mouth nightly as needed for Sleep. * Notice: This list has 4 medication(s) that are the same as other medications prescribed for you. Read the directions carefully, and ask your doctor or other care provider to review them with you. Prescriptions Sent to Pharmacy Refills  
 methylPREDNISolone (MEDROL DOSEPACK) 4 mg tablet 0 Sig: Per dose pack instructions Class: Normal  
 Pharmacy: [facility] [address] MELIZA MARINELLI [address] [place] [address] Ph #: [phone]  
 meloxicam (MOBIC) 7.5 mg tablet 3 Si po bid as needed for pain -- take with food Class: Normal  
 Pharmacy: [facility] DR MELIZA MARINELLI [address] [place] [address] Ph #: [phone]  
 cyclobenzaprine (FLEXERIL) 10 mg tablet 3 Si/2 - 1 po q pm as needed for muscle spasm  Indications: Muscle Spasm Class: Normal  
 Pharmacy: [facility] DR MELIZA MARINELLI [address], [address] [facility] Ph #: [phone] We Performed the Following REFERRAL TO PHYSICAL THERAPY [ICK04 Custom] Comments:  
 Eval/Treat Cervical and Right Shoulder PT, with Dry Needling Patient is W/C Follow-up Instructions Return in about 2 months (around 2018) for PT follow up, Medication follow up. Referral Information Referral ID Referred By Referred To  
  
 [id number] Magdaline Byers [facility] Isidro Zuniga [facility]   
   [id number] [facility]   
   Isidro Zuniga, [address] Phone: [phone] Fax: [phone] Visits Status Start Date End Date 1 New Request 9/6/18 9/6/19 If your referral has a status of pending review or denied, additional information will be sent to support the outcome of this decision. Patient Instructions Neck Arthritis: Exercises Your Care Instructions Here are some examples of typical rehabilitation exercises for your condition. Start each exercise slowly. Ease off the exercise if you start to have pain. Your doctor or physical therapist will tell you when you can start these exercises and which ones will work best for you. How to do the exercises Neck stretches to the side 1. This stretch works best if you keep your shoulder down as you lean away from it. To help you remember to do this, start by relaxing your shoulders and lightly holding on to your thighs or your chair. 2. Tilt your head toward your shoulder and hold for 15 to 30 seconds. Let the weight of your head stretch your muscles. 3. Repeat 2 to 4 times toward each shoulder. Chin tuck 1. Lie on the floor with a rolled-up towel under your neck. Your head should be touching the floor. 2. Slowly bring your chin toward your chest. 
3. Hold for a count of 6, and then relax for up to 10 seconds. 4. Repeat 8 to 12 times. Active cervical rotation 1. Sit in a firm chair, or stand up straight. 2. Keeping your chin level, turn your head to the right, and hold for 15 to 30 seconds. 3. Turn your head to the left and hold for 15 to 30 seconds. 4. Repeat 2 to 4 times to each side. Shoulder blade squeeze 1. While standing, squeeze your shoulder blades together. 2. Do not raise your shoulders up as you are squeezing. 3. Hold for 6 seconds. 4. Repeat 8 to 12 times. Shoulder rolls 1. Sit comfortably with your feet shoulder-width apart. You can also do this exercise standing up. 2. Roll your shoulders up, then back, and then down in a smooth, circular motion. 3. Repeat 2 to 4 times. Follow-up care is a key part of your treatment and safety. Be sure to make and go to all appointments, and call your doctor if you are having problems. It's also a good idea to know your test results and keep a list of the medicines you take. Where can you learn more? Go to http://nancy-warren.info/. Enter F766 in the search box to learn more about \"Neck Arthritis: Exercises. \" Current as of: November 29, 2017 Content Version: 11.7 © 4334-7313 [employer]. Care instructions adapted under license by [employer] (which disclaims liability or warranty for this information). If you have questions about a medical condition or this instruction, always ask your healthcare professional. [address] any warranty or liability for your use of this information. Please provide this summary of care documentation to your next provider. Your primary care clinician is listed as Chinmay Lance. If you have any questions after today's visit, please call [phone].

## 2021-02-02 ENCOUNTER — TRANSCRIBE ORDER (OUTPATIENT)
Dept: REGISTRATION | Age: 61
End: 2021-02-02

## 2021-02-02 ENCOUNTER — HOSPITAL ENCOUNTER (OUTPATIENT)
Dept: LAB | Age: 61
Discharge: HOME | End: 2021-02-02
Payer: MEDICARE

## 2021-02-02 DIAGNOSIS — E11.610 DIABETIC NEUROGENIC ARTHROPATHY (HCC): ICD-10-CM

## 2021-02-02 DIAGNOSIS — E11.610 DIABETIC NEUROGENIC ARTHROPATHY (HCC): Primary | ICD-10-CM

## 2021-02-02 LAB
ALBUMIN SERPL-MCNC: 4.4 G/DL (ref 3.5–4.7)
ALBUMIN/GLOB SERPL: 1.6 {RATIO}
ALP SERPL-CCNC: 90 U/L (ref 38–126)
ALT SERPL-CCNC: 14 U/L (ref 3–52)
ANION GAP SERPL CALC-SCNC: 8 MMOL/L
AST SERPL W P-5'-P-CCNC: 17 U/L (ref 14–74)
BASOPHILS # BLD: 0 K/UL (ref 0–0.1)
BASOPHILS NFR BLD: 1 % (ref 0–2)
BILIRUB SERPL-MCNC: 0.9 MG/DL (ref 0.2–1)
BUN SERPL-MCNC: 16 MG/DL (ref 9–21)
BUN/CREAT SERPL: 20
CA-I BLD-MCNC: 9.3 MG/DL (ref 8.5–10.5)
CHLORIDE SERPL-SCNC: 103 MMOL/L (ref 94–111)
CHOLEST SERPL-MCNC: 164 MG/DL
CO2 SERPL-SCNC: 28 MMOL/L (ref 21–33)
CREAT SERPL-MCNC: 0.8 MG/DL (ref 0.7–1.2)
EOSINOPHIL # BLD: 0.1 K/UL (ref 0–0.4)
EOSINOPHIL NFR BLD: 3 % (ref 0–5)
ERYTHROCYTE [DISTWIDTH] IN BLOOD BY AUTOMATED COUNT: 14.1 % (ref 11.6–14.5)
EST. AVERAGE GLUCOSE BLD GHB EST-MCNC: 160 MG/DL
GLOBULIN SER CALC-MCNC: 2.8 G/DL
GLUCOSE SERPL-MCNC: 153 MG/DL (ref 70–110)
HBA1C MFR BLD: 7.2 % (ref 4–5.6)
HCT VFR BLD AUTO: 42.5 % (ref 35–45)
HDLC SERPL-MCNC: 106 MG/DL
HDLC SERPL: 1.5 {RATIO} (ref 0–5)
HGB BLD-MCNC: 13.6 G/DL (ref 12–16)
IMM GRANULOCYTES # BLD AUTO: 0 K/UL
IMM GRANULOCYTES NFR BLD AUTO: 0 %
LDLC SERPL CALC-MCNC: 43.8 MG/DL (ref 0–100)
LIPID PROFILE,FLP: NORMAL
LYMPHOCYTES # BLD: 1.8 K/UL (ref 0.9–3.6)
LYMPHOCYTES NFR BLD: 41 % (ref 21–52)
MCH RBC QN AUTO: 29.1 PG (ref 24–34)
MCHC RBC AUTO-ENTMCNC: 32 G/DL (ref 31–37)
MCV RBC AUTO: 91 FL (ref 74–97)
MONOCYTES # BLD: 0.3 K/UL (ref 0.05–1.2)
MONOCYTES NFR BLD: 8 % (ref 3–10)
NEUTS SEG # BLD: 2.1 K/UL (ref 1.8–8)
NEUTS SEG NFR BLD: 47 % (ref 40–73)
PLATELET # BLD AUTO: 191 K/UL (ref 135–420)
PMV BLD AUTO: 10.7 FL (ref 9.2–11.8)
POTASSIUM SERPL-SCNC: 3.8 MMOL/L (ref 3.2–5.1)
PROT SERPL-MCNC: 7.2 G/DL (ref 6.1–8.4)
RBC # BLD AUTO: 4.67 M/UL (ref 4.2–5.3)
SODIUM SERPL-SCNC: 139 MMOL/L (ref 135–145)
TRIGL SERPL-MCNC: 71 MG/DL (ref ?–150)
TSH SERPL DL<=0.05 MIU/L-ACNC: 1.66 UIU/ML (ref 0.35–6.2)
VLDLC SERPL CALC-MCNC: 14.2 MG/DL
WBC # BLD AUTO: 4.3 K/UL (ref 4.6–13.2)

## 2021-02-02 PROCEDURE — 80061 LIPID PANEL: CPT

## 2021-02-02 PROCEDURE — 84443 ASSAY THYROID STIM HORMONE: CPT

## 2021-02-02 PROCEDURE — 85025 COMPLETE CBC W/AUTO DIFF WBC: CPT

## 2021-02-02 PROCEDURE — 83036 HEMOGLOBIN GLYCOSYLATED A1C: CPT

## 2021-02-02 PROCEDURE — 36415 COLL VENOUS BLD VENIPUNCTURE: CPT

## 2021-02-02 PROCEDURE — 82043 UR ALBUMIN QUANTITATIVE: CPT

## 2021-02-02 PROCEDURE — 80053 COMPREHEN METABOLIC PANEL: CPT

## 2021-02-04 LAB
CREAT UR-MCNC: 114 MG/DL
MICROALBUMIN UR-MCNC: 16.6 MG/DL
MICROALBUMIN/CREAT UR-RTO: 146 MGMALB/GCRE (ref 0–30)

## 2022-03-19 PROBLEM — E66.01 OBESITY, MORBID (HCC): Status: ACTIVE | Noted: 2018-03-08

## 2022-09-16 ENCOUNTER — HOSPITAL ENCOUNTER (EMERGENCY)
Age: 62
Discharge: HOME | End: 2022-09-17
Attending: EMERGENCY MEDICINE | Admitting: EMERGENCY MEDICINE
Payer: MEDICARE

## 2022-09-16 DIAGNOSIS — E87.6 HYPOKALEMIA: ICD-10-CM

## 2022-09-16 DIAGNOSIS — E16.2 HYPOGLYCEMIA: Primary | ICD-10-CM

## 2022-09-16 LAB
ANION GAP SERPL CALC-SCNC: 9 MMOL/L (ref 3–18)
BASOPHILS # BLD: 0 K/UL (ref 0–0.1)
BASOPHILS NFR BLD: 1 % (ref 0–2)
BUN SERPL-MCNC: 18 MG/DL (ref 7–18)
BUN/CREAT SERPL: 17 (ref 12–20)
CA-I BLD-MCNC: 10.1 MG/DL (ref 8.5–10.1)
CHLORIDE SERPL-SCNC: 105 MMOL/L (ref 100–111)
CO2 SERPL-SCNC: 30 MMOL/L (ref 21–32)
CREAT SERPL-MCNC: 1.07 MG/DL (ref 0.6–1.3)
DIFFERENTIAL METHOD BLD: NORMAL
EOSINOPHIL # BLD: 0.1 K/UL (ref 0–0.4)
EOSINOPHIL NFR BLD: 1 % (ref 0–5)
ERYTHROCYTE [DISTWIDTH] IN BLOOD BY AUTOMATED COUNT: 14.5 % (ref 11.6–14.5)
GLUCOSE BLD STRIP.AUTO-MCNC: 134 MG/DL (ref 70–110)
GLUCOSE BLD STRIP.AUTO-MCNC: 78 MG/DL (ref 70–110)
GLUCOSE SERPL-MCNC: 48 MG/DL (ref 74–99)
HCT VFR BLD AUTO: 41.8 % (ref 35–45)
HGB BLD-MCNC: 13.9 G/DL (ref 12–16)
IMM GRANULOCYTES # BLD AUTO: 0 K/UL (ref 0–0.04)
IMM GRANULOCYTES NFR BLD AUTO: 0 % (ref 0–0.5)
LYMPHOCYTES # BLD: 2.4 K/UL (ref 0.9–3.6)
LYMPHOCYTES NFR BLD: 37 % (ref 21–52)
MCH RBC QN AUTO: 30.8 PG (ref 24–34)
MCHC RBC AUTO-ENTMCNC: 33.3 G/DL (ref 31–37)
MCV RBC AUTO: 92.7 FL (ref 78–100)
MONOCYTES # BLD: 0.6 K/UL (ref 0.05–1.2)
MONOCYTES NFR BLD: 9 % (ref 3–10)
NEUTS SEG # BLD: 3.3 K/UL (ref 1.8–8)
NEUTS SEG NFR BLD: 52 % (ref 40–73)
NRBC # BLD: 0 K/UL (ref 0–0.01)
NRBC BLD-RTO: 0 PER 100 WBC
PERFORMED BY, TECHID: ABNORMAL
PERFORMED BY, TECHID: NORMAL
PLATELET # BLD AUTO: 174 K/UL (ref 135–420)
PMV BLD AUTO: 10.8 FL (ref 9.2–11.8)
POTASSIUM SERPL-SCNC: 2.9 MMOL/L (ref 3.5–5.5)
RBC # BLD AUTO: 4.51 M/UL (ref 4.2–5.3)
SODIUM SERPL-SCNC: 144 MMOL/L (ref 136–145)
TROPONIN-HIGH SENSITIVITY: 4 NG/L (ref 0–54)
WBC # BLD AUTO: 6.4 K/UL (ref 4.6–13.2)

## 2022-09-16 PROCEDURE — 74011250637 HC RX REV CODE- 250/637: Performed by: EMERGENCY MEDICINE

## 2022-09-16 PROCEDURE — 82962 GLUCOSE BLOOD TEST: CPT

## 2022-09-16 PROCEDURE — 85025 COMPLETE CBC W/AUTO DIFF WBC: CPT

## 2022-09-16 PROCEDURE — 80048 BASIC METABOLIC PNL TOTAL CA: CPT

## 2022-09-16 PROCEDURE — 36415 COLL VENOUS BLD VENIPUNCTURE: CPT

## 2022-09-16 PROCEDURE — 74011000250 HC RX REV CODE- 250: Performed by: EMERGENCY MEDICINE

## 2022-09-16 PROCEDURE — 99284 EMERGENCY DEPT VISIT MOD MDM: CPT | Performed by: EMERGENCY MEDICINE

## 2022-09-16 PROCEDURE — 96374 THER/PROPH/DIAG INJ IV PUSH: CPT | Performed by: EMERGENCY MEDICINE

## 2022-09-16 PROCEDURE — 84484 ASSAY OF TROPONIN QUANT: CPT

## 2022-09-16 RX ORDER — SODIUM CHLORIDE 9 MG/ML
1000 INJECTION, SOLUTION INTRAVENOUS ONCE
Status: DISCONTINUED | OUTPATIENT
Start: 2022-09-16 | End: 2022-09-17

## 2022-09-16 RX ORDER — POTASSIUM CHLORIDE 750 MG/1
40 TABLET, EXTENDED RELEASE ORAL
Status: COMPLETED | OUTPATIENT
Start: 2022-09-16 | End: 2022-09-16

## 2022-09-16 RX ADMIN — DEXTROSE MONOHYDRATE 250 ML: 100 INJECTION, SOLUTION INTRAVENOUS at 23:25

## 2022-09-16 RX ADMIN — POTASSIUM CHLORIDE 40 MEQ: 750 TABLET, EXTENDED RELEASE ORAL at 23:23

## 2022-09-17 VITALS
WEIGHT: 265.7 LBS | TEMPERATURE: 97 F | BODY MASS INDEX: 45.36 KG/M2 | HEIGHT: 64 IN | SYSTOLIC BLOOD PRESSURE: 172 MMHG | HEART RATE: 91 BPM | OXYGEN SATURATION: 100 % | RESPIRATION RATE: 18 BRPM | DIASTOLIC BLOOD PRESSURE: 89 MMHG

## 2022-09-17 LAB
APPEARANCE UR: CLEAR
BACTERIA URNS QL MICRO: NEGATIVE /HPF
BILIRUB UR QL: NEGATIVE
COLOR UR: YELLOW
EPITH CASTS URNS QL MICRO: ABNORMAL /LPF (ref 0–20)
GLUCOSE BLD STRIP.AUTO-MCNC: 118 MG/DL (ref 70–110)
GLUCOSE BLD STRIP.AUTO-MCNC: 90 MG/DL (ref 70–110)
GLUCOSE UR STRIP.AUTO-MCNC: NEGATIVE MG/DL
HGB UR QL STRIP: NEGATIVE
KETONES UR QL STRIP.AUTO: NEGATIVE MG/DL
LEUKOCYTE ESTERASE UR QL STRIP.AUTO: ABNORMAL
NITRITE UR QL STRIP.AUTO: NEGATIVE
PERFORMED BY, TECHID: ABNORMAL
PERFORMED BY, TECHID: NORMAL
PH UR STRIP: 5 [PH] (ref 5–8)
PROT UR STRIP-MCNC: NEGATIVE MG/DL
RBC #/AREA URNS HPF: ABNORMAL /HPF (ref 0–2)
SP GR UR REFRACTOMETRY: 1.02 (ref 1–1.03)
UROBILINOGEN UR QL STRIP.AUTO: 0.2 EU/DL (ref 0.2–1)
WBC URNS QL MICRO: ABNORMAL /HPF (ref 0–4)

## 2022-09-17 PROCEDURE — 82962 GLUCOSE BLOOD TEST: CPT

## 2022-09-17 PROCEDURE — 81001 URINALYSIS AUTO W/SCOPE: CPT

## 2022-09-17 NOTE — ED TRIAGE NOTES
Patient arrived complaining of low blood sugar. Patient was at home around 2100 when she started feeling light headed and cool and clammy. Sugar was in the 70s. Patient recently started using insulin pump and had not eaten dinner. Family gave the patient a sandwich, banana, and apple juice and watched sugar but it was maintaining in the 80s and 70s and patient was still feeling like her sugar was low.  Family suspended insulin delivery en route to the hospital.

## 2022-09-17 NOTE — ED PROVIDER NOTES
Pt c/o feeling weak, fatigued, generally, no focal weakness, x 1-2 hours , no numbness, no cp, no abd pain. No fever. H/o sm, says had new insulin pump started 2 days ago, no ha. No n/v/d. Nl po intake. Long h/o dm. Concerned for low bs, was in 70's per pt on mult home checks. Past Medical History:   Diagnosis Date    Brachial neuritis or radiculitis NOS 3/28/2011    Carpal tunnel syndrome on right     Cervicalgia 3/28/2011    Diabetes (HCC)     GERD (gastroesophageal reflux disease)     Hypertension     Neuralgia, neuritis, and radiculitis, unspecified 3/28/2011    Osteoarthritis     Pain in joint, shoulder region 3/28/2011    torn rotator cuff    Postlaminectomy syndrome, cervical region 3/28/2011       Past Surgical History:   Procedure Laterality Date    HX CERVICAL LAMINECTOMY  (?)    HX DILATION AND CURETTAGE      HX ORTHOPAEDIC      right carpal tunnel release, right shoulder arthroscopy & rotator cuff repair    HX ORTHOPAEDIC      HX ORTHOPAEDIC      right shoulder rotator cuff repair    HX SCOT AND BSO      HX TUBAL LIGATION           Family History:   Problem Relation Age of Onset    Diabetes Other     Hypertension Other     Other Other         CVA    Cancer Other         ovarian, prostate       Social History     Socioeconomic History    Marital status:      Spouse name: Not on file    Number of children: Not on file    Years of education: Not on file    Highest education level: Not on file   Occupational History    Not on file   Tobacco Use    Smoking status: Former     Types: Cigarettes     Quit date: 3/28/1991     Years since quittin.4    Smokeless tobacco: Never   Substance and Sexual Activity    Alcohol use:  Yes     Alcohol/week: 3.3 standard drinks     Types: 4 Cans of beer per week     Comment: Occasionally    Drug use: No    Sexual activity: Not on file   Other Topics Concern    Not on file   Social History Narrative    Not on file     Social Determinants of Health Financial Resource Strain: Not on file   Food Insecurity: Not on file   Transportation Needs: Not on file   Physical Activity: Not on file   Stress: Not on file   Social Connections: Not on file   Intimate Partner Violence: Not on file   Housing Stability: Not on file         ALLERGIES: Bactrim [sulfamethoprim ds], Codeine, Sulfa (sulfonamide antibiotics), and Vicodin [hydrocodone-acetaminophen]    Review of Systems   Constitutional:  Positive for fatigue. Negative for fever. HENT:  Negative for congestion. Respiratory:  Negative for cough and shortness of breath. Cardiovascular:  Negative for chest pain. Gastrointestinal:  Negative for abdominal pain and vomiting. Musculoskeletal:  Negative for back pain. Skin:  Negative for rash. Neurological:  Negative for light-headedness. All other systems reviewed and are negative. Vitals:    09/16/22 2230   BP: (!) 174/77   Pulse: (!) 108   Resp: 16   Temp: 97 °F (36.1 °C)   SpO2: 100%   Weight: 120.5 kg (265 lb 11.2 oz)   Height: 5' 4\" (1.626 m)            Physical Exam  Vitals and nursing note reviewed. Constitutional:       Appearance: She is well-developed. She is not diaphoretic. HENT:      Head: Normocephalic and atraumatic. Eyes:      Pupils: Pupils are equal, round, and reactive to light. Cardiovascular:      Rate and Rhythm: Regular rhythm. Tachycardia present. Heart sounds: No murmur heard. Pulmonary:      Effort: Pulmonary effort is normal.      Breath sounds: No wheezing. Abdominal:      Palpations: Abdomen is soft. Tenderness: There is no abdominal tenderness. Musculoskeletal:         General: No tenderness. Cervical back: Normal range of motion. Skin:     General: Skin is dry. Capillary Refill: Capillary refill takes less than 2 seconds. Findings: No rash. Neurological:      Mental Status: She is alert and oriented to person, place, and time.    Psychiatric:         Mood and Affect: Mood normal. MDM         Procedures    Vitals:  Patient Vitals for the past 12 hrs:   Temp Pulse Resp BP SpO2   09/16/22 2230 97 °F (36.1 °C) (!) 108 16 (!) 174/77 100 %         Medications ordered:   Medications   potassium chloride (KLOR-CON M10) tablet 40 mEq (40 mEq Oral Given 9/16/22 2323)   dextrose 10 % infusion 250 mL (250 mL IntraVENous New Bag 9/16/22 2325)         Lab findings:  Recent Results (from the past 12 hour(s))   GLUCOSE, POC    Collection Time: 09/16/22 10:30 PM   Result Value Ref Range    Glucose (POC) 78 70 - 110 mg/dL    Performed by Bull Hightower    CBC WITH AUTOMATED DIFF    Collection Time: 09/16/22 10:40 PM   Result Value Ref Range    WBC 6.4 4.6 - 13.2 K/uL    RBC 4.51 4.20 - 5.30 M/uL    HGB 13.9 12.0 - 16.0 g/dL    HCT 41.8 35.0 - 45.0 %    MCV 92.7 78.0 - 100.0 FL    MCH 30.8 24.0 - 34.0 PG    MCHC 33.3 31.0 - 37.0 g/dL    RDW 14.5 11.6 - 14.5 %    PLATELET 306 171 - 376 K/uL    MPV 10.8 9.2 - 11.8 FL    NRBC 0.0 0.0  WBC    ABSOLUTE NRBC 0.00 0.00 - 0.01 K/uL    NEUTROPHILS 52 40 - 73 %    LYMPHOCYTES 37 21 - 52 %    MONOCYTES 9 3 - 10 %    EOSINOPHILS 1 0 - 5 %    BASOPHILS 1 0 - 2 %    IMMATURE GRANULOCYTES 0 0 - 0.5 %    ABS. NEUTROPHILS 3.3 1.8 - 8.0 K/UL    ABS. LYMPHOCYTES 2.4 0.9 - 3.6 K/UL    ABS. MONOCYTES 0.6 0.05 - 1.2 K/UL    ABS. EOSINOPHILS 0.1 0.0 - 0.4 K/UL    ABS. BASOPHILS 0.0 0.0 - 0.1 K/UL    ABS. IMM.  GRANS. 0.0 0.00 - 0.04 K/UL    DF AUTOMATED     METABOLIC PANEL, BASIC    Collection Time: 09/16/22 10:40 PM   Result Value Ref Range    Sodium 144 136 - 145 mmol/L    Potassium 2.9 (LL) 3.5 - 5.5 mmol/L    Chloride 105 100 - 111 mmol/L    CO2 30 21 - 32 mmol/L    Anion gap 9 3.0 - 18.0 mmol/L    Glucose 48 (LL) 74 - 99 mg/dL    BUN 18 7 - 18 mg/dL    Creatinine 1.07 0.60 - 1.30 mg/dL    BUN/Creatinine ratio 17 12 - 20      GFR est AA >60 >60 ml/min/1.73m2    GFR est non-AA 52 (L) >60 ml/min/1.73m2    Calcium 10.1 8.5 - 10.1 mg/dL   TROPONIN-HIGH SENSITIVITY Collection Time: 09/16/22 10:40 PM   Result Value Ref Range    Troponin-High Sensitivity 4 0 - 54 ng/L   GLUCOSE, POC    Collection Time: 09/16/22 11:55 PM   Result Value Ref Range    Glucose (POC) 134 (H) 70 - 110 mg/dL    Performed by Kojo Finch W/ RFLX MICROSCOPIC    Collection Time: 09/17/22 12:15 AM   Result Value Ref Range    Color Yellow      Appearance Clear      Specific gravity 1.016 1.005 - 1.030      pH (UA) 5.0 5.0 - 8.0      Protein Negative Negative mg/dL    Glucose Negative Negative mg/dL    Ketone Negative Negative mg/dL    Bilirubin Negative Negative      Blood Negative Negative      Urobilinogen 0.2 0.2 - 1.0 EU/dL    Nitrites Negative Negative      Leukocyte Esterase Small (A) Negative     URINE MICROSCOPIC    Collection Time: 09/17/22 12:15 AM   Result Value Ref Range    WBC 0-4 0 - 4 /hpf    RBC 0-5 0 - 2 /hpf    Epithelial cells Few 0 - 20 /lpf    Bacteria Negative (A) None /hpf   GLUCOSE, POC    Collection Time: 09/17/22  1:13 AM   Result Value Ref Range    Glucose (POC) 90 70 - 110 mg/dL    Performed by Norlin Factor Shelia    GLUCOSE, POC    Collection Time: 09/17/22  2:15 AM   Result Value Ref Range    Glucose (POC) 118 (H) 70 - 110 mg/dL    Performed by  [employer]            X-Ray, CT or other radiology findings or impressions:  No orders to display             Progress notes, Consult notes or additional Procedure notes:    No emc. Gluc stable at 2:24 AM pt declines further ed monitoring/testing, wants dc w family now. To dc per plan. Det ret inst given. No emc. To keep insulin pump off until f/up      Diagnosis:   1. Hypoglycemia    2.  Hypokalemia        Disposition: home    Follow-up Information       Follow up With Specialties Details Why Contact Info    [facility] EMERGENCY DEPT Emergency Medicine Go to  As needed, If symptoms worsen Donald Conner MD Family Medicine   [facility] [address] [address]  [phone]               Patient's Medications   Start Taking    No medications on file   Continue Taking    ATORVASTATIN (LIPITOR) 40 MG TABLET    Take 40 mg by mouth daily. BUTALBITAL-ACETAMINOPHEN-CAFFEINE (FIORICET) -40 MG PER TABLET    Take 1-2 tablets by mouth every 4 hours as needed for headache. Maximum dose 6 capsules daily. CYCLOBENZAPRINE (FLEXERIL) 10 MG TABLET    Take 1 Tab by mouth nightly. Indications: Muscle Spasm    CYCLOBENZAPRINE (FLEXERIL) 10 MG TABLET    1/2 - 1 po q pm as needed for muscle spasm  Indications: Muscle Spasm    DICLOFENAC (VOLTAREN) 1 % GEL    Apply 4 g to affected area four (4) times daily. DULOXETINE (CYMBALTA) 30 MG CAPSULE    Take 1 Cap by mouth daily. DULOXETINE (CYMBALTA) 60 MG CAPSULE    Take 1 Cap by mouth daily. GABAPENTIN (NEURONTIN) 300 MG CAPSULE    TAKE TWO CAPSULES BY MOUTH THREE TIMES DAILY    INSULIN ASPART (NOVOLOG FLEXPEN) 100 UNIT/ML INPN    100 Units by SubCUTAneous route Before breakfast, lunch, and dinner. INSULIN GLARGINE (LANTUS SOLOSTAR) 100 UNIT/ML (3 ML) INPN    25 Units by SubCUTAneous route nightly. KETOROLAC (TORADOL) 10 MG TABLET        LOSARTAN-HYDROCHLOROTHIAZIDE (HYZAAR) 50-12.5 MG PER TABLET        MELOXICAM (MOBIC) 7.5 MG TABLET    1 po bid as needed for pain -- take with food    METAXALONE (SKELAXIN) 800 MG TABLET    Take 1 Tab by mouth three (3) times daily. Indications: Muscle Spasm    METFORMIN (GLUCOPHAGE) 1,000 MG TABLET    Take 1,000 mg by mouth two (2) times daily (with meals). METHYLPREDNISOLONE (MEDROL DOSEPACK) 4 MG TABLET    Per dose pack instructions    NAPROXEN (NAPROSYN) 500 MG TABLET        ONDANSETRON (ZOFRAN ODT) 4 MG DISINTEGRATING TABLET    Take 1-2 tablets every 6-8 hours as needed for nausea and vomiting. VALSARTAN-HYDROCHLOROTHIAZIDE (DIOVAN-HCT) 160-12.5 MG PER TABLET    Take 1 Tab by mouth daily.     ZOLPIDEM (AMBIEN) 10 MG TABLET    Take 10 mg by mouth nightly as needed for Sleep.   These Medications have changed    No medications on file   Stop Taking    No medications on file

## 2022-09-17 NOTE — DISCHARGE INSTRUCTIONS
Return for pain, fever not resolving with motrin or tylenol, shortness of breath, vomiting, decreased fluid intake, weakness, numbness, dizziness, or any change or concerns. Follow your insulin closely and stay w family tonight as recommended.

## 2023-05-23 RX ORDER — METHYLPREDNISOLONE 4 MG/1
TABLET ORAL
COMMUNITY
Start: 2018-09-06

## 2023-05-23 RX ORDER — NAPROXEN 500 MG/1
TABLET ORAL
COMMUNITY
Start: 2018-06-27

## 2023-05-23 RX ORDER — BUTALBITAL, ACETAMINOPHEN AND CAFFEINE 50; 325; 40 MG/1; MG/1; MG/1
TABLET ORAL
COMMUNITY
Start: 2016-09-27

## 2023-05-23 RX ORDER — MELOXICAM 7.5 MG/1
TABLET ORAL
COMMUNITY
Start: 2018-09-06

## 2023-05-23 RX ORDER — KETOROLAC TROMETHAMINE 10 MG/1
TABLET, FILM COATED ORAL
COMMUNITY
Start: 2018-06-20

## 2023-05-23 RX ORDER — LOSARTAN POTASSIUM AND HYDROCHLOROTHIAZIDE 12.5; 5 MG/1; MG/1
TABLET ORAL
COMMUNITY
Start: 2018-08-09

## 2023-05-23 RX ORDER — DULOXETIN HYDROCHLORIDE 30 MG/1
30 CAPSULE, DELAYED RELEASE ORAL DAILY
COMMUNITY
Start: 2017-12-14

## 2023-05-23 RX ORDER — ATORVASTATIN CALCIUM 40 MG/1
40 TABLET, FILM COATED ORAL DAILY
COMMUNITY
Start: 2017-06-09

## 2023-05-23 RX ORDER — METAXALONE 800 MG/1
800 TABLET ORAL 3 TIMES DAILY
COMMUNITY
Start: 2017-12-14

## 2023-05-23 RX ORDER — ONDANSETRON 4 MG/1
TABLET, ORALLY DISINTEGRATING ORAL
COMMUNITY
Start: 2017-06-02

## 2023-05-23 RX ORDER — ZOLPIDEM TARTRATE 10 MG/1
10 TABLET ORAL
COMMUNITY

## 2023-05-23 RX ORDER — VALSARTAN AND HYDROCHLOROTHIAZIDE 160; 12.5 MG/1; MG/1
1 TABLET, FILM COATED ORAL DAILY
COMMUNITY
Start: 2017-06-09

## 2023-05-23 RX ORDER — CYCLOBENZAPRINE HCL 10 MG
10 TABLET ORAL
COMMUNITY
Start: 2018-03-08

## 2023-05-23 RX ORDER — DULOXETIN HYDROCHLORIDE 60 MG/1
60 CAPSULE, DELAYED RELEASE ORAL DAILY
COMMUNITY
Start: 2017-08-24

## 2023-05-23 RX ORDER — INSULIN GLARGINE 100 [IU]/ML
25 INJECTION, SOLUTION SUBCUTANEOUS
COMMUNITY

## 2023-05-23 RX ORDER — GABAPENTIN 300 MG/1
CAPSULE ORAL
COMMUNITY
Start: 2018-02-07

## 2024-12-10 NOTE — ED NOTES
I have reviewed discharge instructions with the patient. The patient verbalized understanding. Monitor: The problem is worsening. A1C one month ago was 7.8.  Evaluation: Labs/tests ordered, see encounter summary.  Assessment/Treatment:  Metformin refilled today.  Per patient, she is UTD on annual eye and diabetic foot exams.  Begin statin today.  Recommend regular exercise as tolerated and lower carb diet